# Patient Record
Sex: MALE | Race: OTHER | HISPANIC OR LATINO
[De-identification: names, ages, dates, MRNs, and addresses within clinical notes are randomized per-mention and may not be internally consistent; named-entity substitution may affect disease eponyms.]

---

## 2017-02-14 ENCOUNTER — RECORD ABSTRACTING (OUTPATIENT)
Age: 42
End: 2017-02-14

## 2017-02-14 DIAGNOSIS — I10 ESSENTIAL (PRIMARY) HYPERTENSION: ICD-10-CM

## 2017-02-14 DIAGNOSIS — E11.9 TYPE 2 DIABETES MELLITUS W/OUT COMPLICATIONS: ICD-10-CM

## 2017-02-14 DIAGNOSIS — I67.9 CEREBROVASCULAR DISEASE, UNSPECIFIED: ICD-10-CM

## 2017-02-14 DIAGNOSIS — E78.5 HYPERLIPIDEMIA, UNSPECIFIED: ICD-10-CM

## 2017-02-14 DIAGNOSIS — Z86.73 PERSONAL HISTORY OF TRANSIENT ISCHEMIC ATTACK (TIA), AND CEREBRAL INFARCTION W/OUT RESIDUAL DEFICITS: ICD-10-CM

## 2017-02-14 DIAGNOSIS — E66.9 OBESITY, UNSPECIFIED: ICD-10-CM

## 2017-02-15 ENCOUNTER — APPOINTMENT (OUTPATIENT)
Dept: CARDIOLOGY | Facility: CLINIC | Age: 42
End: 2017-02-15

## 2018-07-27 PROBLEM — Z86.73 HISTORY OF STROKE: Status: RESOLVED | Noted: 2017-02-14 | Resolved: 2018-07-27

## 2021-12-15 ENCOUNTER — EMERGENCY (EMERGENCY)
Facility: HOSPITAL | Age: 46
LOS: 0 days | Discharge: HOME | End: 2021-12-15
Attending: EMERGENCY MEDICINE | Admitting: EMERGENCY MEDICINE
Payer: MEDICAID

## 2021-12-15 VITALS
OXYGEN SATURATION: 97 % | TEMPERATURE: 98 F | DIASTOLIC BLOOD PRESSURE: 97 MMHG | SYSTOLIC BLOOD PRESSURE: 143 MMHG | HEART RATE: 73 BPM | RESPIRATION RATE: 18 BRPM | WEIGHT: 186.95 LBS

## 2021-12-15 DIAGNOSIS — Z95.0 PRESENCE OF CARDIAC PACEMAKER: ICD-10-CM

## 2021-12-15 DIAGNOSIS — E11.9 TYPE 2 DIABETES MELLITUS WITHOUT COMPLICATIONS: ICD-10-CM

## 2021-12-15 DIAGNOSIS — I48.91 UNSPECIFIED ATRIAL FIBRILLATION: ICD-10-CM

## 2021-12-15 DIAGNOSIS — I95.9 HYPOTENSION, UNSPECIFIED: ICD-10-CM

## 2021-12-15 DIAGNOSIS — I95.1 ORTHOSTATIC HYPOTENSION: ICD-10-CM

## 2021-12-15 PROCEDURE — 99284 EMERGENCY DEPT VISIT MOD MDM: CPT

## 2021-12-15 NOTE — ED ADULT NURSE NOTE - OBJECTIVE STATEMENT
Patient c/o sleeping late and not taking midodrine on time. Patient reports feeling better once taking his at home midodrine. Denies nausea, dizziness, vomiting, fever, chills, SOB, CP. Nos/s of distress noted.

## 2021-12-15 NOTE — ED PROVIDER NOTE - NSCAREINITIATED _GEN_ER
ASSISTING PRIMARY RN   PATIENT APPROVED FOR DISCHARGE PER ER PROVIDER. PATIENT GIVEN VERBAL AND WRITTEN DISCHARGE INSTRUCTIONS. GIVEN INSTRUCTIONS ON  FOLLOW UP WITH [place] UNDERSTANDING AND SIGNED DISCHARGE INSTRUCTIONS.      PATIENT AOX3 O
Chitra Philippe)

## 2021-12-15 NOTE — ED PROVIDER NOTE - PATIENT PORTAL LINK FT
You can access the FollowMyHealth Patient Portal offered by [facility] by registering at the following website: [email or web address]. By joining [employer]’s FollowMyHealth portal, you will also be able to view your health information using other applications (apps) compatible with our system.

## 2021-12-15 NOTE — ED PROVIDER NOTE - NSFOLLOWUPINSTRUCTIONS_ED_ALL_ED_FT
Orthostatic Hypotension      Blood pressure is a measurement of how strongly, or weakly, your blood is pressing against the walls of your arteries. Orthostatic hypotension is a sudden drop in blood pressure that happens when you quickly change positions, such as when you get up from sitting or lying down.    Arteries are blood vessels that carry blood from your heart throughout your body. When blood pressure is too low, you may not get enough blood to your brain or to the rest of your organs. This can cause weakness, light-headedness, rapid heartbeat, and fainting. This can last for just a few seconds or for up to a few minutes. Orthostatic hypotension is usually not a serious problem. However, if it happens frequently or gets worse, it may be a sign of something more serious.      What are the causes?  This condition may be caused by:  •Sudden changes in posture, such as standing up quickly after you have been sitting or lying down.      •Blood loss.      •Loss of body fluids (dehydration).      •Heart problems.      •Hormone (endocrine) problems.      •Pregnancy.      •Severe infection.      •Lack of certain nutrients.      •Severe allergic reactions (anaphylaxis).      •Certain medicines, such as blood pressure medicine or medicines that make the body lose excess fluids (diuretics). Sometimes, this condition can be caused by not taking medicine as directed, such as taking too much of a certain medicine.        What increases the risk?  The following factors may make you more likely to develop this condition:  •Age. Risk increases as you get older.      •Conditions that affect the heart or the central nervous system.      •Taking certain medicines, such as blood pressure medicine or diuretics.      •Being pregnant.        What are the signs or symptoms?  Symptoms of this condition may include:  •Weakness.      •Light-headedness.      •Dizziness.      •Blurred vision.      •Fatigue.      •Rapid heartbeat.      •Fainting, in severe cases.        How is this diagnosed?  This condition is diagnosed based on:  •Your medical history.      •Your symptoms.    •Your blood pressure measurement. Your health care provider will check your blood pressure when you are:  •Lying down.      •Sitting.      •Standing.        A blood pressure reading is recorded as two numbers, such as "120 over 80" (or 120/80). The first ("top") number is called the systolic pressure. It is a measure of the pressure in your arteries as your heart beats. The second ("bottom") number is called the diastolic pressure. It is a measure of the pressure in your arteries when your heart relaxes between beats. Blood pressure is measured in a unit called mm Hg. Healthy blood pressure for most adults is 120/80. If your blood pressure is below 90/60, you may be diagnosed with hypotension.  Other information or tests that may be used to diagnose orthostatic hypotension include:  •Your other vital signs, such as your heart rate and temperature.      •Blood tests.      •Tilt table test. For this test, you will be safely secured to a table that moves you from a lying position to an upright position. Your heart rhythm and blood pressure will be monitored during the test.        How is this treated?  This condition may be treated by:  •Changing your diet. This may involve eating more salt (sodium) or drinking more water.      •Taking medicines to raise your blood pressure.      •Changing the dosage of certain medicines you are taking that might be lowering your blood pressure.      •Wearing compression stockings. These stockings help to prevent blood clots and reduce swelling in your legs.    In some cases, you may need to go to the hospital for:  •Fluid replacement. This means you will receive fluids through an IV.      •Blood replacement. This means you will receive donated blood through an IV (transfusion).      •Treating an infection or heart problems, if this applies.      •Monitoring. You may need to be monitored while medicines that you are taking wear off.        Follow these instructions at home:      Eating and drinking      •Drink enough fluid to keep your urine pale yellow.    •Eat a healthy diet, and follow instructions from your health care provider about eating or drinking restrictions. A healthy diet includes:  •Fresh fruits and vegetables.      •Whole grains.      •Lean meats.      •Low-fat dairy products.        •Eat extra salt only as directed. Do not add extra salt to your diet unless your health care provider told you to do that.      •Eat frequent, small meals.      •Avoid standing up suddenly after eating.      Medicines   •Take over-the-counter and prescription medicines only as told by your health care provider.  •Follow instructions from your health care provider about changing the dosage of your current medicines, if this applies.      •Do not stop or adjust any of your medicines on your own.          General instructions      •Wear compression stockings as told by your health care provider.      •Get up slowly from lying down or sitting positions. This gives your blood pressure a chance to adjust.      •Avoid hot showers and excessive heat as directed by your health care provider.      •Return to your normal activities as told by your health care provider. Ask your health care provider what activities are safe for you.      • Do not use any products that contain nicotine or tobacco, such as cigarettes, e-cigarettes, and chewing tobacco. If you need help quitting, ask your health care provider.      •Keep all follow-up visits as told by your health care provider. This is important.        Contact a health care provider if you:    •Vomit.      •Have diarrhea.      •Have a fever for more than 2–3 days.      •Feel more thirsty than usual.      •Feel weak and tired.        Get help right away if you:    •Have chest pain.      •Have a fast or irregular heartbeat.      •Develop numbness in any part of your body.      •Cannot move your arms or your legs.      •Have trouble speaking.      •Become sweaty or feel light-headed.      •Faint.      •Feel short of breath.      •Have trouble staying awake.      •Feel confused.        Summary    •Orthostatic hypotension is a sudden drop in blood pressure that happens when you quickly change positions.      •Orthostatic hypotension is usually not a serious problem.      •It is diagnosed by having your blood pressure taken lying down, sitting, and then standing.      •It may be treated by changing your diet or adjusting your medicines.      This information is not intended to replace advice given to you by your health care provider. Make sure you discuss any questions you have with your health care provider.      Document Revised: 06/13/2019 Document Reviewed: 06/13/2019    [employer] Patient Education © 2021 [employer]

## 2021-12-15 NOTE — ED PROVIDER NOTE - OBJECTIVE STATEMENT
46 y.o. male with a PMH of DM, A.fib, PPM, and orthostatic hypotension presented to the ER c/o dizziness after getting out of bed too quickly.  Reports sleeping late and did not take his midodrine at the usual time.  Pt took his meds and is feeling better.  Denies chest pain, SOB, palpitations, nausea, vomiting, fever, chills.  No other complaints.  PCP in the [place], visiting with his girlfriend.

## 2021-12-15 NOTE — ED PROVIDER NOTE - EYES, MLM
Clear bilaterally, pupils equal, round and reactive to light. Maya Holley  (RN)  2017 02:38:19 Maya Holley  (RN)  2017 02:50:28

## 2021-12-15 NOTE — ED PROVIDER NOTE - NSFOLLOWUPCLINICS_GEN_ALL_ED_FT
[facility]  Medicine  [address], [place]   Phone: [phone]  Fax:   Follow Up Time: 1-3 Days

## 2022-08-03 NOTE — ED ADULT NURSE NOTE - HISTORY OF COVID-19 VACCINATION
----- Message from Jose Ontiveros PA-C sent at 8/3/2022 12:12 PM CDT -----  Please notify.  Follow CDC guidelines for quarantine.   No

## 2024-03-07 ENCOUNTER — OUTPATIENT (OUTPATIENT)
Dept: OUTPATIENT SERVICES | Facility: HOSPITAL | Age: 49
LOS: 1 days | End: 2024-03-07
Payer: MEDICAID

## 2024-03-07 DIAGNOSIS — F52.21 MALE ERECTILE DISORDER: ICD-10-CM

## 2024-03-07 DIAGNOSIS — Z00.8 ENCOUNTER FOR OTHER GENERAL EXAMINATION: ICD-10-CM

## 2024-03-07 PROCEDURE — 93980 PENILE VASCULAR STUDY: CPT | Mod: 26

## 2024-03-07 PROCEDURE — 93980 PENILE VASCULAR STUDY: CPT

## 2024-03-08 DIAGNOSIS — F52.21 MALE ERECTILE DISORDER: ICD-10-CM

## 2024-03-22 ENCOUNTER — APPOINTMENT (OUTPATIENT)
Dept: UROLOGY | Facility: CLINIC | Age: 49
End: 2024-03-22
Payer: MEDICAID

## 2024-03-22 VITALS
RESPIRATION RATE: 16 BRPM | HEIGHT: 75 IN | TEMPERATURE: 98.1 F | HEART RATE: 87 BPM | DIASTOLIC BLOOD PRESSURE: 83 MMHG | WEIGHT: 219 LBS | SYSTOLIC BLOOD PRESSURE: 116 MMHG | OXYGEN SATURATION: 98 % | BODY MASS INDEX: 27.23 KG/M2

## 2024-03-22 DIAGNOSIS — R56.9 UNSPECIFIED CONVULSIONS: ICD-10-CM

## 2024-03-22 DIAGNOSIS — Z78.9 OTHER SPECIFIED HEALTH STATUS: ICD-10-CM

## 2024-03-22 DIAGNOSIS — Z83.3 FAMILY HISTORY OF DIABETES MELLITUS: ICD-10-CM

## 2024-03-22 DIAGNOSIS — M79.2 NEURALGIA AND NEURITIS, UNSPECIFIED: ICD-10-CM

## 2024-03-22 DIAGNOSIS — Z82.49 FAMILY HISTORY OF ISCHEMIC HEART DISEASE AND OTHER DISEASES OF THE CIRCULATORY SYSTEM: ICD-10-CM

## 2024-03-22 PROCEDURE — G2211 COMPLEX E/M VISIT ADD ON: CPT | Mod: NC,1L

## 2024-03-22 PROCEDURE — 99214 OFFICE O/P EST MOD 30 MIN: CPT

## 2024-03-22 RX ORDER — APIXABAN 5 MG/1
5 TABLET, FILM COATED ORAL
Refills: 0 | Status: ACTIVE | COMMUNITY

## 2024-03-22 NOTE — HISTORY OF PRESENT ILLNESS
[FreeTextEntry1] : 48M w hx of uncontrolled type 2 diabetes, hypertension, A-fib status post pacemaker currently on Eliquis, CVA, MI, and below the knee amputation, presents for evaluation of erectile dysfunction and discussion regarding penile prosthesis.  He has a long history of erectile dysfunction, approximately 3 years, and has tried and failed sildenafil 100 mg and Cialis 20 mg.  He reports that he was scheduled to undergo penile prosthesis placement in the [place] in June 2023.  At that time, he developed a nonhealing right foot ulcer which eventually led to below-knee amputation.  He followed up with another urologist who performed a Doppler study, which demonstrated no response to Caverject injection, erectile dysfunction likely due to arterial insufficiency.  Presents today to discuss penile prosthesis.  Blood work from 3/7/2024: HbA1c 7.7 CBC within normal limits CMP demonstrates a glucose of 215 Creatinine 1.22 with estimated GFR 73

## 2024-03-22 NOTE — PHYSICAL EXAM
[General Appearance - Well Developed] : well developed [General Appearance - Well Nourished] : well nourished [Normal Appearance] : normal appearance [Well Groomed] : well groomed [Edema] : no peripheral edema [General Appearance - In No Acute Distress] : no acute distress [Respiration, Rhythm And Depth] : normal respiratory rhythm and effort [Exaggerated Use Of Accessory Muscles For Inspiration] : no accessory muscle use [Abdomen Soft] : soft [Abdomen Tenderness] : non-tender [Costovertebral Angle Tenderness] : no ~M costovertebral angle tenderness [Urinary Bladder Findings] : the bladder was normal on palpation [Penis Abnormality] : normal uncircumcised penis [Testes Tenderness] : no tenderness of the testes [Scrotum] : the scrotum was normal [No Focal Deficits] : no focal deficits [] : no rash [Oriented To Time, Place, And Person] : oriented to person, place, and time [Affect] : the affect was normal [Mood] : the mood was normal [Not Anxious] : not anxious [No Palpable Adenopathy] : no palpable adenopathy [FreeTextEntry1] : In wheelchair [de-identified] : Phimosis with signs of BXO [de-identified] : Right below-knee amputation, ambulating in wheelchair

## 2024-03-22 NOTE — PLAN

## 2024-03-22 NOTE — ASSESSMENT
[FreeTextEntry1] : 48M w hx of uncontrolled type 2 diabetes, hypertension, A-fib status post pacemaker currently on Eliquis, CVA, MI, and below the knee amputation, presents for evaluation of erectile dysfunction and discussion regarding penile prosthesis.    #Phimosis with signs of BXO -Lengthy discussion regarding his history of diabetes and limitations of placing a penile prosthesis with phimosis.  After lengthy discussion the electing for circumcision.  All aspects of circumcision reviewed in detail with regards preparation, outcomes, and adverse events.  They will undergo circumcision the first before proceeding to implant. -He will need both cardiac and medical clearance, especially to discontinue Eliquis prior and after surgery.  #ED likely vasculogenic -Can proceed with implant after circumcision. as pt has already tried and failed maximal oral therapy with PDE5i and ICI. Penile doppler also reviewed and showed no response c/w arterial insufficiency   Erectile dysfunction, its etiology, risk factors and natural history were discussed with the patient. Work-up and empiric management were discussed. From least to most invasive, treatments including behavioral changes (weight loss, exercise, improved sleep), oral PDE5i, vacuum erection device, intraurethral pellets, intracavernosal injections, and penile prosthetic implantation were described. Relevant individual risks and benefits disclosed. I explained that there are different causes for ED including psychogenic, vasculogenic, neurogenic and medication side-effect related causes. Oftentimes there are multiple causes.   The patient understands that the risks of PDE5is include facial redness, flushing, GERD, back pain, priapism, chest pain/MI, arrhythmia, dizziness, drop in BP, impaired vision and loss of hearing. He understands that the medication may take up to an hour to function and requires sexual stimulation. He was told not to take his medication within 4 hours of alpha blockers, or not at all if ever taking nitroglycerin. Both sildenafil and vardenafil, but not tadalafil, have some cross-reactivity with PDE6 and thus may produce visual side effects. He also was told to go to the ER immediately if he noticed any blindness or visual changes, or for any painful erection lasting > 4 hrs. He denies any history nitrate medication use for angina.

## 2024-04-01 ENCOUNTER — OUTPATIENT (OUTPATIENT)
Dept: OUTPATIENT SERVICES | Facility: HOSPITAL | Age: 49
LOS: 1 days | End: 2024-04-01
Payer: MEDICAID

## 2024-04-01 VITALS
HEART RATE: 84 BPM | SYSTOLIC BLOOD PRESSURE: 124 MMHG | TEMPERATURE: 98 F | WEIGHT: 220.02 LBS | RESPIRATION RATE: 16 BRPM | DIASTOLIC BLOOD PRESSURE: 81 MMHG | OXYGEN SATURATION: 99 % | HEIGHT: 75 IN

## 2024-04-01 DIAGNOSIS — Z95.0 PRESENCE OF CARDIAC PACEMAKER: Chronic | ICD-10-CM

## 2024-04-01 DIAGNOSIS — Z89.511 ACQUIRED ABSENCE OF RIGHT LEG BELOW KNEE: Chronic | ICD-10-CM

## 2024-04-01 DIAGNOSIS — Z95.5 PRESENCE OF CORONARY ANGIOPLASTY IMPLANT AND GRAFT: Chronic | ICD-10-CM

## 2024-04-01 DIAGNOSIS — N47.1 PHIMOSIS: ICD-10-CM

## 2024-04-01 DIAGNOSIS — Z01.818 ENCOUNTER FOR OTHER PREPROCEDURAL EXAMINATION: ICD-10-CM

## 2024-04-01 LAB
A1C WITH ESTIMATED AVERAGE GLUCOSE RESULT: 7.6 % — HIGH (ref 4–5.6)
ALBUMIN SERPL ELPH-MCNC: 4.1 G/DL — SIGNIFICANT CHANGE UP (ref 3.5–5.2)
ALP SERPL-CCNC: 95 U/L — SIGNIFICANT CHANGE UP (ref 30–115)
ALT FLD-CCNC: 12 U/L — SIGNIFICANT CHANGE UP (ref 0–41)
ANION GAP SERPL CALC-SCNC: 13 MMOL/L — SIGNIFICANT CHANGE UP (ref 7–14)
APPEARANCE UR: CLEAR — SIGNIFICANT CHANGE UP
APTT BLD: 33.2 SEC — SIGNIFICANT CHANGE UP (ref 27–39.2)
AST SERPL-CCNC: 18 U/L — SIGNIFICANT CHANGE UP (ref 0–41)
BASOPHILS # BLD AUTO: 0.06 K/UL — SIGNIFICANT CHANGE UP (ref 0–0.2)
BASOPHILS NFR BLD AUTO: 0.9 % — SIGNIFICANT CHANGE UP (ref 0–1)
BILIRUB SERPL-MCNC: 0.7 MG/DL — SIGNIFICANT CHANGE UP (ref 0.2–1.2)
BILIRUB UR-MCNC: NEGATIVE — SIGNIFICANT CHANGE UP
BUN SERPL-MCNC: 27 MG/DL — HIGH (ref 10–20)
CALCIUM SERPL-MCNC: 8.9 MG/DL — SIGNIFICANT CHANGE UP (ref 8.4–10.5)
CHLORIDE SERPL-SCNC: 102 MMOL/L — SIGNIFICANT CHANGE UP (ref 98–110)
CO2 SERPL-SCNC: 23 MMOL/L — SIGNIFICANT CHANGE UP (ref 17–32)
COLOR SPEC: YELLOW — SIGNIFICANT CHANGE UP
CREAT SERPL-MCNC: 1.2 MG/DL — SIGNIFICANT CHANGE UP (ref 0.7–1.5)
DIFF PNL FLD: ABNORMAL
EGFR: 74 ML/MIN/1.73M2 — SIGNIFICANT CHANGE UP
EOSINOPHIL # BLD AUTO: 0.17 K/UL — SIGNIFICANT CHANGE UP (ref 0–0.7)
EOSINOPHIL NFR BLD AUTO: 2.5 % — SIGNIFICANT CHANGE UP (ref 0–8)
ESTIMATED AVERAGE GLUCOSE: 171 MG/DL — HIGH (ref 68–114)
GLUCOSE SERPL-MCNC: 197 MG/DL — HIGH (ref 70–99)
GLUCOSE UR QL: >=1000 MG/DL
HCT VFR BLD CALC: 45.8 % — SIGNIFICANT CHANGE UP (ref 42–52)
HGB BLD-MCNC: 15.2 G/DL — SIGNIFICANT CHANGE UP (ref 14–18)
IMM GRANULOCYTES NFR BLD AUTO: 0.3 % — SIGNIFICANT CHANGE UP (ref 0.1–0.3)
INR BLD: 1.18 RATIO — SIGNIFICANT CHANGE UP (ref 0.65–1.3)
KETONES UR-MCNC: NEGATIVE MG/DL — SIGNIFICANT CHANGE UP
LEUKOCYTE ESTERASE UR-ACNC: NEGATIVE — SIGNIFICANT CHANGE UP
LYMPHOCYTES # BLD AUTO: 2.54 K/UL — SIGNIFICANT CHANGE UP (ref 1.2–3.4)
LYMPHOCYTES # BLD AUTO: 37.5 % — SIGNIFICANT CHANGE UP (ref 20.5–51.1)
MCHC RBC-ENTMCNC: 27.4 PG — SIGNIFICANT CHANGE UP (ref 27–31)
MCHC RBC-ENTMCNC: 33.2 G/DL — SIGNIFICANT CHANGE UP (ref 32–37)
MCV RBC AUTO: 82.7 FL — SIGNIFICANT CHANGE UP (ref 80–94)
MONOCYTES # BLD AUTO: 0.62 K/UL — HIGH (ref 0.1–0.6)
MONOCYTES NFR BLD AUTO: 9.1 % — SIGNIFICANT CHANGE UP (ref 1.7–9.3)
NEUTROPHILS # BLD AUTO: 3.37 K/UL — SIGNIFICANT CHANGE UP (ref 1.4–6.5)
NEUTROPHILS NFR BLD AUTO: 49.7 % — SIGNIFICANT CHANGE UP (ref 42.2–75.2)
NITRITE UR-MCNC: NEGATIVE — SIGNIFICANT CHANGE UP
NRBC # BLD: 0 /100 WBCS — SIGNIFICANT CHANGE UP (ref 0–0)
PH UR: 5.5 — SIGNIFICANT CHANGE UP (ref 5–8)
PLATELET # BLD AUTO: 161 K/UL — SIGNIFICANT CHANGE UP (ref 130–400)
PMV BLD: 11 FL — HIGH (ref 7.4–10.4)
POTASSIUM SERPL-MCNC: 4.4 MMOL/L — SIGNIFICANT CHANGE UP (ref 3.5–5)
POTASSIUM SERPL-SCNC: 4.4 MMOL/L — SIGNIFICANT CHANGE UP (ref 3.5–5)
PROT SERPL-MCNC: 7.1 G/DL — SIGNIFICANT CHANGE UP (ref 6–8)
PROT UR-MCNC: SIGNIFICANT CHANGE UP MG/DL
PROTHROM AB SERPL-ACNC: 13.5 SEC — HIGH (ref 9.95–12.87)
RBC # BLD: 5.54 M/UL — SIGNIFICANT CHANGE UP (ref 4.7–6.1)
RBC # FLD: 13.2 % — SIGNIFICANT CHANGE UP (ref 11.5–14.5)
SODIUM SERPL-SCNC: 138 MMOL/L — SIGNIFICANT CHANGE UP (ref 135–146)
SP GR SPEC: 1.03 — SIGNIFICANT CHANGE UP (ref 1–1.03)
T4 AB SER-ACNC: 7.5 UG/DL — SIGNIFICANT CHANGE UP (ref 4.6–12)
TSH SERPL-MCNC: 5.76 UIU/ML — HIGH (ref 0.27–4.2)
UROBILINOGEN FLD QL: 0.2 MG/DL — SIGNIFICANT CHANGE UP (ref 0.2–1)
WBC # BLD: 6.78 K/UL — SIGNIFICANT CHANGE UP (ref 4.8–10.8)
WBC # FLD AUTO: 6.78 K/UL — SIGNIFICANT CHANGE UP (ref 4.8–10.8)

## 2024-04-01 PROCEDURE — 85730 THROMBOPLASTIN TIME PARTIAL: CPT

## 2024-04-01 PROCEDURE — 85610 PROTHROMBIN TIME: CPT

## 2024-04-01 PROCEDURE — 83036 HEMOGLOBIN GLYCOSYLATED A1C: CPT

## 2024-04-01 PROCEDURE — 84436 ASSAY OF TOTAL THYROXINE: CPT

## 2024-04-01 PROCEDURE — 99214 OFFICE O/P EST MOD 30 MIN: CPT | Mod: 25

## 2024-04-01 PROCEDURE — 36415 COLL VENOUS BLD VENIPUNCTURE: CPT

## 2024-04-01 PROCEDURE — 85025 COMPLETE CBC W/AUTO DIFF WBC: CPT

## 2024-04-01 PROCEDURE — 93010 ELECTROCARDIOGRAM REPORT: CPT

## 2024-04-01 PROCEDURE — 81001 URINALYSIS AUTO W/SCOPE: CPT

## 2024-04-01 PROCEDURE — 80053 COMPREHEN METABOLIC PANEL: CPT

## 2024-04-01 PROCEDURE — 93005 ELECTROCARDIOGRAM TRACING: CPT

## 2024-04-01 PROCEDURE — 84443 ASSAY THYROID STIM HORMONE: CPT

## 2024-04-01 PROCEDURE — 87086 URINE CULTURE/COLONY COUNT: CPT

## 2024-04-01 RX ORDER — INSULIN GLARGINE 100 [IU]/ML
32 INJECTION, SOLUTION SUBCUTANEOUS
Refills: 0 | DISCHARGE

## 2024-04-01 NOTE — H&P PST ADULT - NSICDXPASTSURGICALHX_GEN_ALL_CORE_FT
PAST SURGICAL HISTORY:  Pacemaker     S/P BKA (below knee amputation), right     S/P coronary artery stent placement

## 2024-04-01 NOTE — H&P PST ADULT - REASON FOR ADMISSION
Case Type: OP  Suite: [facility]  Proceduralist: Renny London  Confirmed Surgery Date Time: 04- -  PAST Date Time: 04- - 6:30  Procedure: CIRCUMCISION  Laterality: N/A  Length of Procedure: 30 Minutes  Anesthesia Type: General

## 2024-04-01 NOTE — H&P PST ADULT - NSANTHOSAYNRD_GEN_A_CORE
No. MATHEW screening performed.  STOP BANG Legend: 0-2 = LOW Risk; 3-4 = INTERMEDIATE Risk; 5-8 = HIGH Risk

## 2024-04-01 NOTE — H&P PST ADULT - PATIENT'S GENDER IDENTITY
Nursing Note by Chica Ventura NCMA at 05/05/18 08:12 AM     Author:  Chica Ventura NCMA Service:  (none) Author Type:  Certified Medical Assistant     Filed:  05/05/18 08:12 AM Encounter Date:  5/5/2018 Status:  Signed     :  Chica Ventura NCMA (Certified Medical Assistant)            If provider orders tests at today's visit, patient would like to be contacted via  ([place] or by telephone).  If to contact patient by phone, patient's preferred phone # is   [phone] (cell)   and it is[PB1.1T] ok[PB1.1M] to leave message on voice mail or with family member.  If medications are ordered at today's visit, the pharmacy name/location patient would like them to be sent to is [facility] [address]  .[PB1.1T]         Revision History        User Key Date/Time User Provider Type Action    > PB1.1 05/05/18 08:12 AM Chica Ventura NCMA Certified Medical Assistant Sign    M - Manual, T - Template            
Male

## 2024-04-01 NOTE — H&P PST ADULT - HISTORY OF PRESENT ILLNESS
Patient is a 49 yo male wheeled to [facility] by his wife for the preparations of Circumcision due to excessive foreskin. Patient is also planned to have penile prosthesis after circumcision.  Patient denies any cp, sob, palpitations, fever, cough, URI, abdominal pains, N/V, UTI, Rashes or open wounds.  As per patient exercise tolerance of 1-2 fos walks with out sob  Patient denies any s/s covid 19 and reports no contact with known positive people. Patient instructed to continue to self monitor and report any concerns to MD. Pt will continue to practice self isolation and  exposure control measures pre op   Anesthesia Alert  NO--Difficult Airway  NO--History of neck surgery or radiation  NO--Limited ROM of neck  NO--History of Malignant hyperthermia  NO--Personal or family history of Pseudocholinesterase deficiency  NO--Prior Anesthesia Complication  NO--Latex Allergy  NO--Loose teeth  NO--History of Rheumatoid Arthritis  NO--MATHEW  YES--Risk of bleeding due to Eliquis   Pt instructed to stop vitamins/supplements/herbal medications for one week prior to surgery  As per patient this is the complete medical, surgical history and medications.  Duke Activity Status Index (DASI) from [facility]  on 4/1/2024  ** All calculations should be rechecked by clinician prior to use **    RESULT SUMMARY:  9.95 points  The higher the score (maximum 58.2), the higher the functional status.  3.97 METs  INPUTS:  Take care of self —> 2.75 = Yes  Walk indoors —> 1.75 = Yes  Walk 1&ndash;2 blocks on level ground —> 2.75 = Yes  Climb a flight of stairs or walk up a hill —> 0 = No  Run a short distance —> 0 = No  Do light work around the house —> 2.7 = Yes  Do moderate work around the house —> 0 = No  Do heavy work around the house —> 0 = No  Do yardwork —> 0 = No  Have sexual relations —> 0 = No  Participate in moderate recreational activities —> 0 = No  Participate in strenuous sports —> 0 = No  Revised Cardiac Risk Index for Pre-Operative Risk from [facility]  on 4/1/2024  ** All calculations should be rechecked by clinician prior to use **    RESULT SUMMARY:  3 points  Class IV Risk  15.0 %  30-day risk of death, MI, or cardiac arrest  From [place] 2017. These numbers are higher than those from the original study (Jerel 1999). See Evidence for details.  INPUTS:  Elevated-risk surgery —> 1 = Yes  History of ischemic heart disease —> 1 = Yes  History of congestive heart failure —> 0 = No  History of cerebrovascular disease —> 1 = Yes  Pre-operative treatment with insulin —> 0 = No  Pre-operative creatinine >2 mg/dL / 176.8 µmol/L —> 0 = No

## 2024-04-01 NOTE — H&P PST ADULT - ATTENDING COMMENTS
Plan for circumcision. Risks, benefits and alternatives discussed with patient.    Pt understands there is a risk of bleeding which can lead to a hematoma and need for another operation. Risk of infection and/or dehiscence of incision. This may require antibiotics, wound debridement, and dressing changes which can take several weeks to months to heal. Anesthesia risks also reviewed.  He understands that he can not have sex for at least 4-6 weeks, no showering for first two days, and the he must apply bacitracin over his incision for the next two weeks twice a day.     Pt provided verbal and written consent.

## 2024-04-01 NOTE — H&P PST ADULT - NSICDXFAMILYHX_GEN_ALL_CORE_FT
FAMILY HISTORY:  Father  Still living? No  Family history of diabetes mellitus (DM), Age at diagnosis: Age Unknown  FH: CAD (coronary artery disease), Age at diagnosis: Age Unknown

## 2024-04-01 NOTE — H&P PST ADULT - NSICDXPASTMEDICALHX_GEN_ALL_CORE_FT
PAST MEDICAL HISTORY:  Afib     CHF (congestive heart failure)     Chronic kidney disease (CKD)     CVA (cerebral vascular accident)     DM (diabetes mellitus)     Erectile disorder     GERD (gastroesophageal reflux disease)     H/O hyperthyroidism     History of myocardial ischemia     HTN (hypertension)     Seizures

## 2024-04-02 DIAGNOSIS — Z01.818 ENCOUNTER FOR OTHER PREPROCEDURAL EXAMINATION: ICD-10-CM

## 2024-04-02 DIAGNOSIS — N47.1 PHIMOSIS: ICD-10-CM

## 2024-04-02 LAB
CULTURE RESULTS: SIGNIFICANT CHANGE UP
SPECIMEN SOURCE: SIGNIFICANT CHANGE UP

## 2024-04-09 ENCOUNTER — RESULT REVIEW (OUTPATIENT)
Age: 49
End: 2024-04-09

## 2024-04-09 ENCOUNTER — TRANSCRIPTION ENCOUNTER (OUTPATIENT)
Age: 49
End: 2024-04-09

## 2024-04-09 ENCOUNTER — OUTPATIENT (OUTPATIENT)
Dept: OUTPATIENT SERVICES | Facility: HOSPITAL | Age: 49
LOS: 1 days | Discharge: HOME | End: 2024-04-09
Payer: MEDICAID

## 2024-04-09 ENCOUNTER — APPOINTMENT (OUTPATIENT)
Dept: UROLOGY | Facility: HOSPITAL | Age: 49
End: 2024-04-09

## 2024-04-09 VITALS
WEIGHT: 220.02 LBS | SYSTOLIC BLOOD PRESSURE: 157 MMHG | HEIGHT: 75 IN | HEART RATE: 79 BPM | DIASTOLIC BLOOD PRESSURE: 90 MMHG | OXYGEN SATURATION: 99 % | RESPIRATION RATE: 17 BRPM | TEMPERATURE: 96 F

## 2024-04-09 VITALS — HEART RATE: 78 BPM | DIASTOLIC BLOOD PRESSURE: 89 MMHG | SYSTOLIC BLOOD PRESSURE: 147 MMHG | RESPIRATION RATE: 16 BRPM

## 2024-04-09 DIAGNOSIS — Z89.511 ACQUIRED ABSENCE OF RIGHT LEG BELOW KNEE: Chronic | ICD-10-CM

## 2024-04-09 DIAGNOSIS — N47.1 PHIMOSIS: ICD-10-CM

## 2024-04-09 DIAGNOSIS — Z95.5 PRESENCE OF CORONARY ANGIOPLASTY IMPLANT AND GRAFT: Chronic | ICD-10-CM

## 2024-04-09 DIAGNOSIS — Z95.0 PRESENCE OF CARDIAC PACEMAKER: Chronic | ICD-10-CM

## 2024-04-09 PROCEDURE — 82962 GLUCOSE BLOOD TEST: CPT

## 2024-04-09 PROCEDURE — 88304 TISSUE EXAM BY PATHOLOGIST: CPT | Mod: 26

## 2024-04-09 PROCEDURE — 54161 CIRCUM 28 DAYS OR OLDER: CPT

## 2024-04-09 PROCEDURE — 88304 TISSUE EXAM BY PATHOLOGIST: CPT

## 2024-04-09 RX ORDER — ACETAMINOPHEN 500 MG
1000 TABLET ORAL ONCE
Refills: 0 | Status: DISCONTINUED | OUTPATIENT
Start: 2024-04-09 | End: 2024-04-09

## 2024-04-09 RX ORDER — LEVOTHYROXINE SODIUM 125 MCG
1 TABLET ORAL
Refills: 0 | DISCHARGE

## 2024-04-09 RX ORDER — ACETAMINOPHEN 500 MG/1
500 TABLET, COATED ORAL
Qty: 42 | Refills: 0 | Status: ACTIVE | COMMUNITY
Start: 2024-04-09 | End: 1900-01-01

## 2024-04-09 RX ORDER — CEPHALEXIN 500 MG/1
500 CAPSULE ORAL
Qty: 10 | Refills: 0 | Status: ACTIVE | COMMUNITY
Start: 2024-04-09 | End: 1900-01-01

## 2024-04-09 RX ORDER — ONDANSETRON 8 MG/1
4 TABLET, FILM COATED ORAL ONCE
Refills: 0 | Status: DISCONTINUED | OUTPATIENT
Start: 2024-04-09 | End: 2024-04-09

## 2024-04-09 RX ORDER — SODIUM CHLORIDE 9 MG/ML
1000 INJECTION, SOLUTION INTRAVENOUS
Refills: 0 | Status: DISCONTINUED | OUTPATIENT
Start: 2024-04-09 | End: 2024-04-09

## 2024-04-09 RX ADMIN — SODIUM CHLORIDE 100 MILLILITER(S): 9 INJECTION, SOLUTION INTRAVENOUS at 10:01

## 2024-04-09 NOTE — ASU DISCHARGE PLAN (ADULT/PEDIATRIC) - ASU DC SPECIAL INSTRUCTIONSFT
Continue ALL previously prescribed home meds including meds for Diabetes.    Take Tylenol for pain  Take Keflex antibiotic for 5 days to prevent infection given your high risk.  Follow up in 1 week for post op check, my  will call to schedule the appointment.    Remove dressing after two days, then you may shower.   No sex/masturbation for 6 weeks.

## 2024-04-09 NOTE — ASU DISCHARGE PLAN (ADULT/PEDIATRIC) - NS MD DC FALL RISK RISK
For information on Fall & Injury Prevention, visit: [email or web address] OR  [email or web address] OR  https://www.cdc.gov/steadi/patient.html

## 2024-04-09 NOTE — PRE-ANESTHESIA EVALUATION ADULT - NSANTHAGERD_ENT_A_CORE
Body Location Override (Optional): Right Superior Medial Upper Back Detail Level: Detailed Add 11891 Cpt? (Important Note: In 2017 The Use Of 25046 Is Being Tracked By Cms To Determine Future Global Period Reimbursement For Global Periods): yes Wound Evaluated By (Optional): Valeri Cockayne DO Wound Diameter In Cm(Optional): 0 Wound Crusting?: clean Sutures?: intact Wound Edema?: mild Wound Color?: pink Patient To Follow-Up With?: their primary dermatologist No

## 2024-04-09 NOTE — BRIEF OPERATIVE NOTE - NSICDXBRIEFPOSTOP_GEN_ALL_CORE_FT
POST-OP DIAGNOSIS:  Phimosis 09-Apr-2024 09:58:27  Renny London  BXO (balanitis xerotica obliterans) 09-Apr-2024 09:58:33  Renny London

## 2024-04-09 NOTE — BRIEF OPERATIVE NOTE - OPERATION/FINDINGS
Foreskin difficult to retract, excess smega removed and pt reprepped x2  Phimotic foreskin excised  Foreskin sent as specimen to pathology  4-chromic in interrupted fashion   xerform and trace dressing used as compressive dressing, pt to keep on for 2 days  Ppx antibiotics w Keflex

## 2024-04-09 NOTE — BRIEF OPERATIVE NOTE - NSICDXBRIEFPREOP_GEN_ALL_CORE_FT
PRE-OP DIAGNOSIS:  Phimosis 09-Apr-2024 09:58:13  Renny London  BXO (balanitis xerotica obliterans) 09-Apr-2024 09:58:19  Renny London

## 2024-04-09 NOTE — PRE-ANESTHESIA EVALUATION ADULT - NSANTHTIREDRD_ENT_A_CORE
[FreeTextEntry1] : \par Examination reveals a well built, well nourished individual, who presents to the office walking independently. Patient is afebrile today and is in NAD. Patient is well oriented to time, place, and person with appropriate mood and affect. Patient is able to get off and on the exam table without any problems.Gross cutaneous exam is normal. there is no significant lymphadenopathy or ligament laxity. \par \par Exam of spine: \par Flank asymmetry with deeper right flank crease \par With forward bend, mild left sided thoracic prominence \par ATR of 2-4 \par No edema, erythema, or ecchymosis noted\par 5/5 strength\par Full ROM of b/l upper and lower extremities\par No pressure sores or abrasions noted\par NV intact\par Brace appears to be fitting well\par \par Patient has Significant flat feet with standing. The arches collapse and the heels tip into valgus. The arches reform when sitting and on toe dorsiflexion. Subtalar motion is stiff There is mild heel cord tightness. The dorsiflexion is possible to 10 degrees with knee in extension and plantar flexion to 15 degrees. Knee range of motion is from 0-130 degrees of flexion on both sides.\par  \par Neurological examination reveals a grade 5/5 muscle power, sensation intact to crude touch and pinprick. Deep tendon reflexes are 1+ with ankle jerk and the knee jerk. The plantars are bilaterally down-going. There is no hairy patch, lipoma, sinus in the back. There is no pes cavus, asymmetry of the calves, significant leg length discrepancy, or significant cafe-au-lait spots. \par \par Evaluation of the left middle finger\par Splint removed for examination\par Skin is intact and there is no breakdown or abrasion\par he has extensor lag\par No angular or rotational deformity \par +mild ttp over the distal phalanx\par Neurovascularly intact. Nailbed and nail is intact.\par  \par  No

## 2024-04-15 DIAGNOSIS — N48.0 LEUKOPLAKIA OF PENIS: ICD-10-CM

## 2024-04-15 DIAGNOSIS — K21.9 GASTRO-ESOPHAGEAL REFLUX DISEASE WITHOUT ESOPHAGITIS: ICD-10-CM

## 2024-04-15 DIAGNOSIS — N18.9 CHRONIC KIDNEY DISEASE, UNSPECIFIED: ICD-10-CM

## 2024-04-15 DIAGNOSIS — Z79.84 LONG TERM (CURRENT) USE OF ORAL HYPOGLYCEMIC DRUGS: ICD-10-CM

## 2024-04-15 DIAGNOSIS — N47.1 PHIMOSIS: ICD-10-CM

## 2024-04-15 DIAGNOSIS — Z79.01 LONG TERM (CURRENT) USE OF ANTICOAGULANTS: ICD-10-CM

## 2024-04-15 DIAGNOSIS — Z89.511 ACQUIRED ABSENCE OF RIGHT LEG BELOW KNEE: ICD-10-CM

## 2024-04-15 DIAGNOSIS — Z79.4 LONG TERM (CURRENT) USE OF INSULIN: ICD-10-CM

## 2024-04-15 DIAGNOSIS — E11.22 TYPE 2 DIABETES MELLITUS WITH DIABETIC CHRONIC KIDNEY DISEASE: ICD-10-CM

## 2024-04-15 DIAGNOSIS — I48.91 UNSPECIFIED ATRIAL FIBRILLATION: ICD-10-CM

## 2024-04-15 DIAGNOSIS — I50.9 HEART FAILURE, UNSPECIFIED: ICD-10-CM

## 2024-04-15 DIAGNOSIS — R56.9 UNSPECIFIED CONVULSIONS: ICD-10-CM

## 2024-04-15 DIAGNOSIS — Z95.0 PRESENCE OF CARDIAC PACEMAKER: ICD-10-CM

## 2024-04-15 DIAGNOSIS — Z88.5 ALLERGY STATUS TO NARCOTIC AGENT: ICD-10-CM

## 2024-04-15 DIAGNOSIS — Z86.73 PERSONAL HISTORY OF TRANSIENT ISCHEMIC ATTACK (TIA), AND CEREBRAL INFARCTION WITHOUT RESIDUAL DEFICITS: ICD-10-CM

## 2024-04-15 DIAGNOSIS — I13.0 HYPERTENSIVE HEART AND CHRONIC KIDNEY DISEASE WITH HEART FAILURE AND STAGE 1 THROUGH STAGE 4 CHRONIC KIDNEY DISEASE, OR UNSPECIFIED CHRONIC KIDNEY DISEASE: ICD-10-CM

## 2024-04-15 DIAGNOSIS — Z95.5 PRESENCE OF CORONARY ANGIOPLASTY IMPLANT AND GRAFT: ICD-10-CM

## 2024-04-15 PROBLEM — I10 ESSENTIAL (PRIMARY) HYPERTENSION: Chronic | Status: ACTIVE | Noted: 2024-04-01

## 2024-04-15 PROBLEM — E11.9 TYPE 2 DIABETES MELLITUS WITHOUT COMPLICATIONS: Chronic | Status: ACTIVE | Noted: 2024-04-01

## 2024-04-15 PROBLEM — Z86.39 PERSONAL HISTORY OF OTHER ENDOCRINE, NUTRITIONAL AND METABOLIC DISEASE: Chronic | Status: ACTIVE | Noted: 2024-04-01

## 2024-04-15 PROBLEM — N52.9 MALE ERECTILE DYSFUNCTION, UNSPECIFIED: Chronic | Status: ACTIVE | Noted: 2024-04-01

## 2024-04-15 PROBLEM — Z86.79 PERSONAL HISTORY OF OTHER DISEASES OF THE CIRCULATORY SYSTEM: Chronic | Status: ACTIVE | Noted: 2024-04-01

## 2024-04-15 PROBLEM — I63.9 CEREBRAL INFARCTION, UNSPECIFIED: Chronic | Status: ACTIVE | Noted: 2024-04-01

## 2024-04-19 ENCOUNTER — APPOINTMENT (OUTPATIENT)
Dept: UROLOGY | Facility: CLINIC | Age: 49
End: 2024-04-19
Payer: MEDICAID

## 2024-04-19 VITALS
BODY MASS INDEX: 27.35 KG/M2 | SYSTOLIC BLOOD PRESSURE: 121 MMHG | HEIGHT: 75 IN | OXYGEN SATURATION: 98 % | DIASTOLIC BLOOD PRESSURE: 85 MMHG | WEIGHT: 220 LBS | HEART RATE: 91 BPM

## 2024-04-19 DIAGNOSIS — N48.0 LEUKOPLAKIA OF PENIS: ICD-10-CM

## 2024-04-19 DIAGNOSIS — N47.1 PHIMOSIS: ICD-10-CM

## 2024-04-19 PROCEDURE — G2211 COMPLEX E/M VISIT ADD ON: CPT | Mod: NC,1L

## 2024-04-19 PROCEDURE — 99214 OFFICE O/P EST MOD 30 MIN: CPT | Mod: 24

## 2024-05-10 ENCOUNTER — APPOINTMENT (OUTPATIENT)
Dept: UROLOGY | Facility: CLINIC | Age: 49
End: 2024-05-10
Payer: MEDICAID

## 2024-05-10 VITALS
WEIGHT: 220 LBS | TEMPERATURE: 98.1 F | SYSTOLIC BLOOD PRESSURE: 150 MMHG | RESPIRATION RATE: 16 BRPM | HEIGHT: 75 IN | BODY MASS INDEX: 27.35 KG/M2 | OXYGEN SATURATION: 98 % | DIASTOLIC BLOOD PRESSURE: 98 MMHG | HEART RATE: 89 BPM

## 2024-05-10 DIAGNOSIS — N52.01 ERECTILE DYSFUNCTION DUE TO ARTERIAL INSUFFICIENCY: ICD-10-CM

## 2024-05-10 DIAGNOSIS — Z98.890 OTHER SPECIFIED POSTPROCEDURAL STATES: ICD-10-CM

## 2024-05-10 PROCEDURE — 99213 OFFICE O/P EST LOW 20 MIN: CPT

## 2024-05-10 PROCEDURE — G2211 COMPLEX E/M VISIT ADD ON: CPT | Mod: NC,1L

## 2024-05-10 NOTE — ASSESSMENT
[FreeTextEntry1] : #Phimosis with signs of BXO - s/p circumcision on 4/9/24. Healing well without any pain. All stitches have fallen office. Wound is intact without any evidence of infection. There is a small ~2mm superficial separation at incision 10 o clock position. No drainage or evidence of infection. Pt instructed to apply bacitracin. This should heal in 1-2 weeks.  - Pt is Tentatively scheduled for his Penile Prosthesis on 5/28/24 - Will check in with patient one week prior to confirm he has made a complete recovery - Pt still requires medical and cardiac clearance  All questions and concerns addressed.

## 2024-05-10 NOTE — HISTORY OF PRESENT ILLNESS
[FreeTextEntry1] : 48M w hx of uncontrolled type 2 diabetes, hypertension, A-fib status post pacemaker currently on Eliquis, CVA, MI, and below the knee amputation, presents for evaluation of erectile dysfunction and discussion regarding penile prosthesis. He has a long history of erectile dysfunction, approximately 3 years, and has tried and failed sildenafil 100 mg and Cialis 20 mg. He reports that he was scheduled to undergo penile prosthesis placement in the [place] in June 2023. At that time, he developed a nonhealing right foot ulcer which eventually led to below-knee amputation. He followed up with another urologist who performed a Doppler study, which demonstrated no response to Caverject injection, erectile dysfunction likely due to arterial insufficiency. Presents today to discuss penile prosthesis.  Blood work from 3/7/2024: HbA1c 7.7 CBC within normal limits CMP demonstrates a glucose of 215 Creatinine 1.22 with estimated GFR 73  Office visit 4/19/24 Pt presents for a post op visit. He is s/p circumcision on 4/9/24. Reports that his pain has been well controlled. No fevers or chills at home. Took all of his appropriate medications and has been applying bacitracin to his incision daily. On exam, his incision is intact with sutures in place. No evidence of infection ie warmth, erythema, fluctuance or purulent drainage. He is still interested in having a penile prosthesis placed we reviewed all the risk benefits and alternatives to the procedure and patient provided verbal consent.  Office 5/10/24 Pt presents for wound check now that he is 4 weeks post op. He has made a near 100% recovery. Reports having no penile pain. On exam there is no swelling, inflammation or drainage. Sutures have all fallen off. There is a small superficial 2mm skin defect at the ~10 o'clock position.

## 2024-05-15 ENCOUNTER — OUTPATIENT (OUTPATIENT)
Dept: OUTPATIENT SERVICES | Facility: HOSPITAL | Age: 49
LOS: 1 days | End: 2024-05-15
Payer: MEDICAID

## 2024-05-15 VITALS
HEIGHT: 75 IN | TEMPERATURE: 98 F | RESPIRATION RATE: 16 BRPM | WEIGHT: 220.02 LBS | HEART RATE: 94 BPM | SYSTOLIC BLOOD PRESSURE: 113 MMHG | DIASTOLIC BLOOD PRESSURE: 80 MMHG | OXYGEN SATURATION: 99 %

## 2024-05-15 DIAGNOSIS — Z95.0 PRESENCE OF CARDIAC PACEMAKER: Chronic | ICD-10-CM

## 2024-05-15 DIAGNOSIS — Z01.818 ENCOUNTER FOR OTHER PREPROCEDURAL EXAMINATION: ICD-10-CM

## 2024-05-15 DIAGNOSIS — Z89.511 ACQUIRED ABSENCE OF RIGHT LEG BELOW KNEE: Chronic | ICD-10-CM

## 2024-05-15 DIAGNOSIS — N52.01 ERECTILE DYSFUNCTION DUE TO ARTERIAL INSUFFICIENCY: ICD-10-CM

## 2024-05-15 DIAGNOSIS — Z95.5 PRESENCE OF CORONARY ANGIOPLASTY IMPLANT AND GRAFT: Chronic | ICD-10-CM

## 2024-05-15 LAB
A1C WITH ESTIMATED AVERAGE GLUCOSE RESULT: 7.1 % — HIGH (ref 4–5.6)
ALBUMIN SERPL ELPH-MCNC: 4.1 G/DL — SIGNIFICANT CHANGE UP (ref 3.5–5.2)
ALP SERPL-CCNC: 110 U/L — SIGNIFICANT CHANGE UP (ref 30–115)
ALT FLD-CCNC: 9 U/L — SIGNIFICANT CHANGE UP (ref 0–41)
ANION GAP SERPL CALC-SCNC: 17 MMOL/L — HIGH (ref 7–14)
APPEARANCE UR: CLEAR — SIGNIFICANT CHANGE UP
APTT BLD: 31.8 SEC — SIGNIFICANT CHANGE UP (ref 27–39.2)
AST SERPL-CCNC: 20 U/L — SIGNIFICANT CHANGE UP (ref 0–41)
BASOPHILS # BLD AUTO: 0.06 K/UL — SIGNIFICANT CHANGE UP (ref 0–0.2)
BASOPHILS NFR BLD AUTO: 0.6 % — SIGNIFICANT CHANGE UP (ref 0–1)
BILIRUB SERPL-MCNC: 0.8 MG/DL — SIGNIFICANT CHANGE UP (ref 0.2–1.2)
BILIRUB UR-MCNC: NEGATIVE — SIGNIFICANT CHANGE UP
BUN SERPL-MCNC: 20 MG/DL — SIGNIFICANT CHANGE UP (ref 10–20)
CALCIUM SERPL-MCNC: 8.8 MG/DL — SIGNIFICANT CHANGE UP (ref 8.4–10.5)
CHLORIDE SERPL-SCNC: 101 MMOL/L — SIGNIFICANT CHANGE UP (ref 98–110)
CO2 SERPL-SCNC: 23 MMOL/L — SIGNIFICANT CHANGE UP (ref 17–32)
COLOR SPEC: YELLOW — SIGNIFICANT CHANGE UP
CREAT SERPL-MCNC: 1.5 MG/DL — SIGNIFICANT CHANGE UP (ref 0.7–1.5)
DIFF PNL FLD: ABNORMAL
EGFR: 57 ML/MIN/1.73M2 — LOW
EOSINOPHIL # BLD AUTO: 0.13 K/UL — SIGNIFICANT CHANGE UP (ref 0–0.7)
EOSINOPHIL NFR BLD AUTO: 1.3 % — SIGNIFICANT CHANGE UP (ref 0–8)
ESTIMATED AVERAGE GLUCOSE: 157 MG/DL — HIGH (ref 68–114)
GLUCOSE SERPL-MCNC: 136 MG/DL — HIGH (ref 70–99)
GLUCOSE UR QL: >=1000 MG/DL
HCT VFR BLD CALC: 43.4 % — SIGNIFICANT CHANGE UP (ref 42–52)
HGB BLD-MCNC: 14.6 G/DL — SIGNIFICANT CHANGE UP (ref 14–18)
IMM GRANULOCYTES NFR BLD AUTO: 0.4 % — HIGH (ref 0.1–0.3)
INR BLD: 1.4 RATIO — HIGH (ref 0.65–1.3)
KETONES UR-MCNC: NEGATIVE MG/DL — SIGNIFICANT CHANGE UP
LEUKOCYTE ESTERASE UR-ACNC: NEGATIVE — SIGNIFICANT CHANGE UP
LYMPHOCYTES # BLD AUTO: 1.5 K/UL — SIGNIFICANT CHANGE UP (ref 1.2–3.4)
LYMPHOCYTES # BLD AUTO: 15.5 % — LOW (ref 20.5–51.1)
MCHC RBC-ENTMCNC: 28.1 PG — SIGNIFICANT CHANGE UP (ref 27–31)
MCHC RBC-ENTMCNC: 33.6 G/DL — SIGNIFICANT CHANGE UP (ref 32–37)
MCV RBC AUTO: 83.6 FL — SIGNIFICANT CHANGE UP (ref 80–94)
MONOCYTES # BLD AUTO: 0.63 K/UL — HIGH (ref 0.1–0.6)
MONOCYTES NFR BLD AUTO: 6.5 % — SIGNIFICANT CHANGE UP (ref 1.7–9.3)
NEUTROPHILS # BLD AUTO: 7.34 K/UL — HIGH (ref 1.4–6.5)
NEUTROPHILS NFR BLD AUTO: 75.7 % — HIGH (ref 42.2–75.2)
NITRITE UR-MCNC: NEGATIVE — SIGNIFICANT CHANGE UP
NRBC # BLD: 0 /100 WBCS — SIGNIFICANT CHANGE UP (ref 0–0)
PH UR: 6 — SIGNIFICANT CHANGE UP (ref 5–8)
PLATELET # BLD AUTO: 185 K/UL — SIGNIFICANT CHANGE UP (ref 130–400)
PMV BLD: 11.3 FL — HIGH (ref 7.4–10.4)
POTASSIUM SERPL-MCNC: 4.2 MMOL/L — SIGNIFICANT CHANGE UP (ref 3.5–5)
POTASSIUM SERPL-SCNC: 4.2 MMOL/L — SIGNIFICANT CHANGE UP (ref 3.5–5)
PROT SERPL-MCNC: 7.5 G/DL — SIGNIFICANT CHANGE UP (ref 6–8)
PROT UR-MCNC: 30 MG/DL
PROTHROM AB SERPL-ACNC: 16 SEC — HIGH (ref 9.95–12.87)
RBC # BLD: 5.19 M/UL — SIGNIFICANT CHANGE UP (ref 4.7–6.1)
RBC # FLD: 12.5 % — SIGNIFICANT CHANGE UP (ref 11.5–14.5)
SODIUM SERPL-SCNC: 141 MMOL/L — SIGNIFICANT CHANGE UP (ref 135–146)
SP GR SPEC: >1.03 — HIGH (ref 1–1.03)
UROBILINOGEN FLD QL: 0.2 MG/DL — SIGNIFICANT CHANGE UP (ref 0.2–1)
WBC # BLD: 9.7 K/UL — SIGNIFICANT CHANGE UP (ref 4.8–10.8)
WBC # FLD AUTO: 9.7 K/UL — SIGNIFICANT CHANGE UP (ref 4.8–10.8)

## 2024-05-15 PROCEDURE — 85730 THROMBOPLASTIN TIME PARTIAL: CPT

## 2024-05-15 PROCEDURE — 87086 URINE CULTURE/COLONY COUNT: CPT

## 2024-05-15 PROCEDURE — 85025 COMPLETE CBC W/AUTO DIFF WBC: CPT

## 2024-05-15 PROCEDURE — 36415 COLL VENOUS BLD VENIPUNCTURE: CPT

## 2024-05-15 PROCEDURE — 85610 PROTHROMBIN TIME: CPT

## 2024-05-15 PROCEDURE — 83036 HEMOGLOBIN GLYCOSYLATED A1C: CPT

## 2024-05-15 PROCEDURE — 99214 OFFICE O/P EST MOD 30 MIN: CPT | Mod: 25

## 2024-05-15 PROCEDURE — 93005 ELECTROCARDIOGRAM TRACING: CPT

## 2024-05-15 PROCEDURE — 81001 URINALYSIS AUTO W/SCOPE: CPT

## 2024-05-15 PROCEDURE — 80053 COMPREHEN METABOLIC PANEL: CPT

## 2024-05-15 PROCEDURE — 93010 ELECTROCARDIOGRAM REPORT: CPT

## 2024-05-15 RX ORDER — GABAPENTIN 400 MG/1
2 CAPSULE ORAL
Refills: 0 | DISCHARGE

## 2024-05-15 RX ORDER — LEVETIRACETAM 250 MG/1
1 TABLET, FILM COATED ORAL
Refills: 0 | DISCHARGE

## 2024-05-15 RX ORDER — EMPAGLIFLOZIN 10 MG/1
1 TABLET, FILM COATED ORAL
Refills: 0 | DISCHARGE

## 2024-05-15 RX ORDER — PANTOPRAZOLE SODIUM 20 MG/1
1 TABLET, DELAYED RELEASE ORAL
Refills: 0 | DISCHARGE

## 2024-05-15 RX ORDER — INSULIN GLARGINE 100 [IU]/ML
32 INJECTION, SOLUTION SUBCUTANEOUS
Refills: 0 | DISCHARGE

## 2024-05-15 RX ORDER — INSULIN LISPRO 100/ML
0 VIAL (ML) SUBCUTANEOUS
Refills: 0 | DISCHARGE

## 2024-05-15 RX ORDER — METOPROLOL TARTRATE 50 MG
1 TABLET ORAL
Refills: 0 | DISCHARGE

## 2024-05-15 RX ORDER — ATORVASTATIN CALCIUM 80 MG/1
1 TABLET, FILM COATED ORAL
Refills: 0 | DISCHARGE

## 2024-05-15 RX ORDER — SACUBITRIL AND VALSARTAN 24; 26 MG/1; MG/1
1 TABLET, FILM COATED ORAL
Refills: 0 | DISCHARGE

## 2024-05-15 RX ORDER — APIXABAN 2.5 MG/1
1 TABLET, FILM COATED ORAL
Refills: 0 | DISCHARGE

## 2024-05-15 NOTE — H&P PST ADULT - NSICDXPASTSURGICALHX_GEN_ALL_CORE_FT
Chief Complaint/Reason for Visit:   Chief Complaint   Patient presents with   • Hospital F/U     [facility] F/uP Elevated & CVA  ,,  [facility] Pt P.C[facility]P     HISTORY OF PRESENT ILLNESS: Luz Maria Perez is a 86 year old female with a past medical history of coronary artery disease, hypertension, carotid artery disease, dyslipidemia, pacemaker, CKD, anemia, GI bleeds who presented to [facility] with severe anemia.    Aspirin was discontinued.  During hospitalization she developed acute left hemispheric stroke that was treated conservatively.  Troponin was elevated suggestive of NSTEMI but medical management was recommended.    She was sent to subacute rehab for 1 week and is now home with physical therapy, Occupational Therapy, and speech therapy.    She presents to the office today for hospital follow-up accompanied by her daughter Ute.    Patient did not speak during OV today. She did respond to yes/no questions by nodding/shaking head.     REVIEW OF SYSTEMS:  Review of Systems   Unable to perform ROS: acuity of condition   Constitutional: Negative for decreased appetite.   Cardiovascular: Positive for leg swelling. Negative for chest pain, dyspnea on exertion and palpitations.   Respiratory: Negative for shortness of breath.    Neurological: Negative for dizziness.     Limited review of system obtained by patient responding to yes/no questions.  Additional review of systems by her daughter.    PAST MEDICAL HISTORY:   Past Medical History:   Diagnosis Date   • Anemia    • Anxiety    • Arthritis    • Cardiac pacemaker    • Cerebrovascular accident (CVA) due to embolism of left middle cerebral artery (CMS/HCC) 4/13/2022   • Chronic kidney disease    • Chronic pain    • Congestive cardiac failure (CMS/HCC)    • Coronary artery disease    • Depression    • Essential (primary) hypertension    • Fracture     Left wrist    • High cholesterol    • Other cirrhosis of liver (CMS/HCC) 10/29/2021   •  Otitis media    • Sinusitis, chronic    • Thyroid condition    • Urinary incontinence      PAST SURGICAL HISTORY:   Past Surgical History:   Procedure Laterality Date   • Abdomen surgery     • Back surgery     • Cardiac catherization     • Cardiac surgery     • Coronary artery bypass graft      2002   • Endarterectomy      carotid- 2009   • Eye surgery      Cataracts bilateral   • Pacemaker      2014   • Removal gallbladder     • Tonsillectomy     • Vascular surgery       FAMILY HISTORY:   No family history on file.    SOCIAL HISTORY:   Social History     Tobacco Use   • Smoking status: Former Smoker   • Smokeless tobacco: Never Used   Vaping Use   • Vaping Use: never used   Substance Use Topics   • Alcohol use: Not Currently     Comment: Occasionally    • Drug use: Never       Drug Use:    Never           ALLERGIES:   ALLERGIES:   Allergen Reactions   • Bempedoic Acid PRURITUS   • Ezetimibe Muscle Spasm     Muscle cramps   • Iodine Other (See Comments)     AFFECTS KIDNEY FUNCTION     MEDICATIONS:   Current Outpatient Medications   Medication Sig Dispense Refill   • ARIPiprazole (ABILIFY) 10 MG tablet Take 10 mg by mouth nightly.     • Cholecalciferol 50 mcg (2,000 units) tablet Take 50 mcg by mouth daily.     • cetirizine (ZyrTEC) 5 MG tablet Take 1 tablet by mouth daily. 30 tablet 0   • AMIODarone (PACERONE) 200 MG tablet Take 100 mg by mouth daily.     • ferrous sulfate 325 (65 FE) MG EC tablet Take 325 mg by mouth daily (with breakfast).     • KRILL OIL PO Take 1 tablet by mouth daily. Do not start before April 21, 2022. 500mg     • torsemide (DEMADEX) 20 MG tablet Take 20 mg by mouth 5 days a week.     • levothyroxine 50 MCG tablet Take 50 mcg by mouth daily.      • labetalol (NORMODYNE) 200 MG tablet Take 200 mg by mouth 2 times daily. TAKE ONE TABLET BY MOUTH EVERY 12 HOURS FOR BLOOD PRESSURE      • Multiple Vitamins-Minerals (Lutein-Zeaxanthin) Tab Take 10 mg by mouth daily.     • pantoprazole (PROTONIX) 40  MG tablet Take 1 tablet by mouth 2 times daily (before meals). 30 tablet 0   • sucralfate (CARAFATE) 1 g tablet Take 1 tablet by mouth 3 times daily. 120 tablet 0     No current facility-administered medications for this visit.     PHYSICAL  EXAMINATION  Visit Vitals  BP (!) 144/68 (BP Location: RUE - Right upper extremity, Patient Position: Sitting)   Pulse 80   Ht 5' 5\" (1.651 m)   Wt 79.4 kg (175 lb)   LMP  (LMP Unknown)   BMI 29.12 kg/m²       Physical Exam  Vitals reviewed.   Constitutional:       General: She is not in acute distress.     Appearance: Normal appearance. She is well-developed. She is not diaphoretic.   HENT:      Head: Normocephalic and atraumatic.   Eyes:      Conjunctiva/sclera: Conjunctivae normal.   Neck:      Vascular: No carotid bruit or JVD.   Cardiovascular:      Rate and Rhythm: Normal rate and regular rhythm.      Pulses: Normal pulses.      Heart sounds: S1 normal and S2 normal. No murmur heard.  Pulmonary:      Effort: Pulmonary effort is normal. No respiratory distress.      Breath sounds: Normal breath sounds. No wheezing or rales.   Chest:      Chest wall: No tenderness.   Abdominal:      General: There is no distension.   Musculoskeletal:         General: Normal range of motion.      Cervical back: Normal range of motion.      Comments: In wheelchair   Skin:     General: Skin is warm and dry.   Neurological:      Mental Status: She is alert. Mental status is at baseline.         Diagnostic Testing:     TTE 4/7/2022  SUMMARY:     1. Left ventricle: The cavity size is normal. Wall thickness is mildly to moderately increased. Systolic function is normal. The ejection fraction was measured by single plane method of disks. The ejection fraction is 62%.  2. Aortic valve: The annulus is normal-sized and mildly calcified. The valve is trileaflet. The leaflets are mildly thickened. Transvalvular velocity is within the normal range. There is no stenosis.  3. Mitral valve: The annulus is  normal-sized. The annulus is moderately calcified. The leaflets are moderately thickened and moderately calcified. Mild regurgitation. The peak diastolic gradient is 7mm Hg.  4. Left atrium: The atrium is severely dilated.  5. Right ventricle: Pacemaker lead noted in right ventricle.  6. Right atrium: Pacemaker lead noted in right atrium.     Labs:    Recent Labs   Lab 11/26/21  1023   Cholesterol 126   HDL 52   Triglycerides 78   CALCLDL 58   Non-HDL Cholesterol 74     Recent Labs   Lab 04/13/22  0541 04/06/22  0535 04/05/22  0541   Sodium 141   < > 144   Chloride 110*   < > 113*   BUN 47*   < > 52*   BUN/ Creatinine Ratio 34*   < > 30*   Potassium 4.1   < > 3.9   Glucose 97   < > 93   Creatinine 1.39*   < > 1.74*   Calcium 9.2   < > 8.9   TSH  --   --  6.847*    < > = values in this interval not displayed.     IMPRESSION/PLAN:    1. Hospital discharge follow-up  -Admitted to [facility] with severe anemia, GIB  -Elevated troponin-- NSTEMI  -Acute CVA    2. Coronary artery disease involving native heart, unspecified vessel or lesion type, unspecified whether angina present  3. Hx of CABG  -Elevated troponin suggestive of NSTEMI  -Echo from 4/7/2022  shows EF 62%, mild MR  -Lexiscan stress test shows a medium-sized, severe, reversible defect involving the basal and mid inferolateral wall(s), consistent with ischemia  -Continue beta blocker  -No angina  -No ASA due to GIB and anemia    4. PAF (paroxysmal atrial fibrillation) (CMS/HCC)  -Maintaining SR on amiodarone  -Not on anticoagulation due to anemia and GIB  -Continue amiodarone and beta blocker    5. Presence of cardiac pacemaker  -s/p DDD pacer implantation  -Pace dependent  -No arrhythmia on recent device check  -Continue to monitor in device clinic    6. Hypertension, unspecified type  -Blood pressure is reasonably well controlled    7. Dyslipidemia  -Lipid panel reviewed    8. Carotid artery disease, unspecified laterality, unspecified type (CMS/HCC)  -CEA  2009  -Off ASA due to anemia and GIB    9. Recent CVA  -Management conservatively  -Receiving home PT, OT, speech therapy  -Refer to neurology    4/27/2022    Return for Dr. Churchill as scheduled.    Jenn Sultana CNP   [facility]   PAST SURGICAL HISTORY:  Pacemaker     S/P BKA (below knee amputation), right     S/P coronary artery stent placement

## 2024-05-15 NOTE — H&P PST ADULT - ATTENDING COMMENTS
Risks below were explained to the patient for placement of an inflatable 3 piece penile prosthesis device    I spent additional time extensively reviewing the risks, benefits, pros and cons of the penile implant procedure. This included treatment alternatives. The patient verbalized the risks and wanted to proceed. I again made sure he knew that the penile implant is a prosthesis that contains three basic elements consisting of: two parallel hollow cylinders that are placed within the penis, a pump and valve mechanism that is placed in the scrotum, and a spherical reservoir filled with saline that is placed in the lower abdomen.    I explained that this is a surgical procedure that is relatively simple but can be more complicated in the presence of scar tissue in the pelvic area due to previous surgery and which will sometimes require an additional or alternative incision for placement of the reservoir, or potentially a narrow-based device if extensive fibrosis of the penis noted.    We reiterated that the penile implant is designed to simulate but not necessarily replace the natural erectile capability that he previously had. Once implanted, there will be limited or no capability of natural erections occurring in the future and will limit the opportunity for other treatment options such as pills or injections, to successfully work in his body. I explained how the implant reservoir has a lock-out valve to limit the opportunity for fluid to flow into the cylinders and create an unexpected or unwanted erection, called "Auto Inflation". Due to the lock-out valve, this rarely occurs, however if the reservoir is allowed to heal with the penile cylinders maintained partially inflated, he recognized that a partial erection will always be present, and it is possible for auto-inflation to occur.    We talked about the fact that the penile implant will not grow in length beyond the basic, "stretched penis". This is the length he considers to be sufficient for sexual intercourse. The glans, or tip, of the penis will not expand, as the tip of the cylinders is designed to stabilize and support the glans, but not inflate beyond the penile corona. He was also counseled that although the results with an inflatable penile prosthesis are very good, the erection will never be as large as the previous normal erection. The size of the flaccid penis may differ from that before the surgery.    He understands that the risk of serious complications during surgery is very low. There are a number of difficulties that can arise at the time of surgery and are usually overcome. Rarely the operation cannot be completed or an alternative to an inflatable penile prosthesis placed such as a semi-rigid implant. The prosthesis is a mechanical device, which may suffer mechanical failure. The current 10-year survival rate of the inflatable penile prosthesis is greater than 90%. An operation can be performed to replace a device that has suffered mechanical failure.    He has discussed the post-op care and expectations with me. He realizes that it is not uncommon for patients to experience swelling, bruising, pain, irritation, etc. for the first four-to-six weeks after the procedure.    Lastly, I made sure again that he understood that, like any surgical procedure, complications may occur. These complications include but are not limited to infection (1-4%) and erosion (1-11%) (Erosion is when the prosthesis erodes through to the outside of the body). An infected prosthesis cannot be saved by antibiotics alone and must then be removed. It is possible to insert prosthesis at the same operative session (salvage) or at a later date, but this is more difficult and the infection rate is higher. If another prosthesis cannot be inserted at the same time as the infected one removed, significant and irreversible penile shortening will occur.         I informed him that he may call for any questions or concerns at any time.

## 2024-05-15 NOTE — H&P PST ADULT - HISTORY OF PRESENT ILLNESS
Patient is a 48 year old  male presenting to [facility] in preparation for Insertion of inflatable penile prosthesis, AMS  on 5/28/24  under GA anesthesia by Dr. Renny London.    Patient is seeking penile prosthesis due to impotence.     PATIENT CURRENTLY DENIES CHEST PAIN  SHORTNESS OF BREATH  PALPITATIONS,  DYSURIA, OR UPPER RESPIRATORY INFECTION IN PAST 2 WEEKS  denies travel outside the [place] in the past 30 days      Anesthesia Alert  YES--Difficult Airway CLASS 3 AIRWAY  NO--History of neck surgery or radiation  NO--Limited ROM of neck  NO--History of Malignant hyperthermia  NO--No personal or family history of Pseudocholinesterase deficiency.  NO--Prior Anesthesia Complication  NO--Latex Allergy  NO--Loose teeth  NO--History of Rheumatoid Arthritis  yes--Bleeding risk  on eliquis  NO--MATHEW  NO--Other_____    Right lower leg stump abrasion, covered in bandage.     AS PER THE PT, THIS IS HIS/HER COMPLETE MEDICAL AND SURGICAL HX, INCLUDING MEDICATIONS PRESCRIBED AND OVER THE COUNTER    Patient verbalized understanding of instructions and was given the opportunity to ask questions and have them answered.    pt denies any suicidal ideation or thoughts, pt states not a threat to self or others    Revised Cardiac Risk Index for Pre-Operative Risk from [email or web address]  on 5/15/2024  ** All calculations should be rechecked by clinician prior to use **    RESULT SUMMARY:  4 points  Class IV Risk    15.0 %  30-day risk of death, MI, or cardiac arrest    From Duceppe 2017. These numbers are higher than those from the original study (Jerel 1999). See Evidence for details.      INPUTS:  Elevated-risk surgery —> 0 = No  History of ischemic heart disease —> 1 = Yes  History of congestive heart failure —> 1 = Yes  History of cerebrovascular disease —> 1 = Yes  Pre-operative treatment with insulin —> 1 = Yes  Pre-operative creatinine >2 mg/dL / 176.8 µmol/L —> 0 = No      Duke Activity Status Index (DASI) from [email or web address]  on 5/15/2024  ** All calculations should be rechecked by clinician prior to use **    RESULT SUMMARY:  7.25 points  The higher the score (maximum 58.2), the higher the functional status.    3.63 METs        INPUTS:  Take care of self —> 2.75 = Yes  Walk indoors —> 1.75 = Yes  Walk 1&ndash;2 blocks on level ground —> 2.75 = Yes  Climb a flight of stairs or walk up a hill —> 0 = No  Run a short distance —> 0 = No  Do light work around the house —> 0 = No  Do moderate work around the house —> 0 = No  Do heavy work around the house —> 0 = No  Do yardwork —> 0 = No  Have sexual relations —> 0 = No  Participate in moderate recreational activities —> 0 = No  Participate in strenuous sports —> 0 = No

## 2024-05-15 NOTE — H&P PST ADULT - REASON FOR ADMISSION
Case Type: OP Block TimeSuite[facility]  Confirmed Surgery DateTime: 05- - 0:00PAST DateTime: 05- - 14:15Procedure: Insertion of inflatable penile prosthesis, AMS  ERP?: NoLaterality: N/ALength of Procedure: 90 Minutes  Anesthesia Type: General

## 2024-05-15 NOTE — H&P PST ADULT - NSICDXPASTMEDICALHX_GEN_ALL_CORE_FT
PAST MEDICAL HISTORY:  Afib     CAD (coronary artery disease)     CHF (congestive heart failure)     Chronic kidney disease (CKD)     CVA (cerebral vascular accident)     DM (diabetes mellitus)     Erectile disorder     GERD (gastroesophageal reflux disease)     H/O hyperthyroidism     History of myocardial ischemia     HTN (hypertension)     Ischemic cardiomyopathy     Seizures

## 2024-05-16 DIAGNOSIS — Z01.818 ENCOUNTER FOR OTHER PREPROCEDURAL EXAMINATION: ICD-10-CM

## 2024-05-16 DIAGNOSIS — N52.01 ERECTILE DYSFUNCTION DUE TO ARTERIAL INSUFFICIENCY: ICD-10-CM

## 2024-05-16 LAB
CULTURE RESULTS: SIGNIFICANT CHANGE UP
SPECIMEN SOURCE: SIGNIFICANT CHANGE UP

## 2024-05-28 ENCOUNTER — APPOINTMENT (OUTPATIENT)
Dept: UROLOGY | Facility: HOSPITAL | Age: 49
End: 2024-05-28

## 2024-06-13 PROBLEM — I25.5 ISCHEMIC CARDIOMYOPATHY: Chronic | Status: ACTIVE | Noted: 2024-05-15

## 2024-06-13 PROBLEM — I25.10 ATHEROSCLEROTIC HEART DISEASE OF NATIVE CORONARY ARTERY WITHOUT ANGINA PECTORIS: Chronic | Status: ACTIVE | Noted: 2024-05-15

## 2024-08-06 ENCOUNTER — OUTPATIENT (OUTPATIENT)
Dept: OUTPATIENT SERVICES | Facility: HOSPITAL | Age: 49
LOS: 1 days | End: 2024-08-06
Payer: MEDICAID

## 2024-08-06 VITALS
OXYGEN SATURATION: 100 % | SYSTOLIC BLOOD PRESSURE: 128 MMHG | RESPIRATION RATE: 18 BRPM | TEMPERATURE: 98 F | DIASTOLIC BLOOD PRESSURE: 77 MMHG | WEIGHT: 222.67 LBS | HEIGHT: 75 IN | HEART RATE: 92 BPM

## 2024-08-06 DIAGNOSIS — Z95.5 PRESENCE OF CORONARY ANGIOPLASTY IMPLANT AND GRAFT: Chronic | ICD-10-CM

## 2024-08-06 DIAGNOSIS — Z95.0 PRESENCE OF CARDIAC PACEMAKER: Chronic | ICD-10-CM

## 2024-08-06 DIAGNOSIS — Z89.511 ACQUIRED ABSENCE OF RIGHT LEG BELOW KNEE: Chronic | ICD-10-CM

## 2024-08-06 DIAGNOSIS — N52.01 ERECTILE DYSFUNCTION DUE TO ARTERIAL INSUFFICIENCY: ICD-10-CM

## 2024-08-06 DIAGNOSIS — Z01.818 ENCOUNTER FOR OTHER PREPROCEDURAL EXAMINATION: ICD-10-CM

## 2024-08-06 PROBLEM — Z86.39 PERSONAL HISTORY OF OTHER ENDOCRINE, NUTRITIONAL AND METABOLIC DISEASE: Chronic | Status: INACTIVE | Noted: 2024-04-01 | Resolved: 2024-08-06

## 2024-08-06 LAB
A1C WITH ESTIMATED AVERAGE GLUCOSE RESULT: 8 % — HIGH (ref 4–5.6)
ALBUMIN SERPL ELPH-MCNC: 4.2 G/DL — SIGNIFICANT CHANGE UP (ref 3.5–5.2)
ALP SERPL-CCNC: 135 U/L — HIGH (ref 30–115)
ALT FLD-CCNC: 8 U/L — SIGNIFICANT CHANGE UP (ref 0–41)
ANION GAP SERPL CALC-SCNC: 11 MMOL/L — SIGNIFICANT CHANGE UP (ref 7–14)
APPEARANCE UR: CLEAR — SIGNIFICANT CHANGE UP
APTT BLD: 32.9 SEC — SIGNIFICANT CHANGE UP (ref 27–39.2)
AST SERPL-CCNC: 17 U/L — SIGNIFICANT CHANGE UP (ref 0–41)
BASOPHILS # BLD AUTO: 0.04 K/UL — SIGNIFICANT CHANGE UP (ref 0–0.2)
BASOPHILS NFR BLD AUTO: 0.6 % — SIGNIFICANT CHANGE UP (ref 0–1)
BILIRUB SERPL-MCNC: 0.6 MG/DL — SIGNIFICANT CHANGE UP (ref 0.2–1.2)
BILIRUB UR-MCNC: NEGATIVE — SIGNIFICANT CHANGE UP
BUN SERPL-MCNC: 24 MG/DL — HIGH (ref 10–20)
CALCIUM SERPL-MCNC: 8.6 MG/DL — SIGNIFICANT CHANGE UP (ref 8.4–10.5)
CHLORIDE SERPL-SCNC: 102 MMOL/L — SIGNIFICANT CHANGE UP (ref 98–110)
CO2 SERPL-SCNC: 26 MMOL/L — SIGNIFICANT CHANGE UP (ref 17–32)
COLOR SPEC: YELLOW — SIGNIFICANT CHANGE UP
CREAT SERPL-MCNC: 1.3 MG/DL — SIGNIFICANT CHANGE UP (ref 0.7–1.5)
DIFF PNL FLD: ABNORMAL
EGFR: 67 ML/MIN/1.73M2 — SIGNIFICANT CHANGE UP
EOSINOPHIL # BLD AUTO: 0.2 K/UL — SIGNIFICANT CHANGE UP (ref 0–0.7)
EOSINOPHIL NFR BLD AUTO: 3 % — SIGNIFICANT CHANGE UP (ref 0–8)
ESTIMATED AVERAGE GLUCOSE: 183 MG/DL — HIGH (ref 68–114)
GLUCOSE SERPL-MCNC: 267 MG/DL — HIGH (ref 70–99)
GLUCOSE UR QL: >=1000 MG/DL
HCT VFR BLD CALC: 42 % — SIGNIFICANT CHANGE UP (ref 42–52)
HGB BLD-MCNC: 14.1 G/DL — SIGNIFICANT CHANGE UP (ref 14–18)
IMM GRANULOCYTES NFR BLD AUTO: 0.3 % — SIGNIFICANT CHANGE UP (ref 0.1–0.3)
INR BLD: 1.1 RATIO — SIGNIFICANT CHANGE UP (ref 0.65–1.3)
KETONES UR-MCNC: NEGATIVE MG/DL — SIGNIFICANT CHANGE UP
LEUKOCYTE ESTERASE UR-ACNC: NEGATIVE — SIGNIFICANT CHANGE UP
LYMPHOCYTES # BLD AUTO: 2.19 K/UL — SIGNIFICANT CHANGE UP (ref 1.2–3.4)
LYMPHOCYTES # BLD AUTO: 32.7 % — SIGNIFICANT CHANGE UP (ref 20.5–51.1)
MCHC RBC-ENTMCNC: 27.7 PG — SIGNIFICANT CHANGE UP (ref 27–31)
MCHC RBC-ENTMCNC: 33.6 G/DL — SIGNIFICANT CHANGE UP (ref 32–37)
MCV RBC AUTO: 82.5 FL — SIGNIFICANT CHANGE UP (ref 80–94)
MONOCYTES # BLD AUTO: 0.55 K/UL — SIGNIFICANT CHANGE UP (ref 0.1–0.6)
MONOCYTES NFR BLD AUTO: 8.2 % — SIGNIFICANT CHANGE UP (ref 1.7–9.3)
NEUTROPHILS # BLD AUTO: 3.69 K/UL — SIGNIFICANT CHANGE UP (ref 1.4–6.5)
NEUTROPHILS NFR BLD AUTO: 55.2 % — SIGNIFICANT CHANGE UP (ref 42.2–75.2)
NITRITE UR-MCNC: NEGATIVE — SIGNIFICANT CHANGE UP
NRBC # BLD: 0 /100 WBCS — SIGNIFICANT CHANGE UP (ref 0–0)
PH UR: 6 — SIGNIFICANT CHANGE UP (ref 5–8)
PLATELET # BLD AUTO: 152 K/UL — SIGNIFICANT CHANGE UP (ref 130–400)
PMV BLD: 10.7 FL — HIGH (ref 7.4–10.4)
POTASSIUM SERPL-MCNC: 4.8 MMOL/L — SIGNIFICANT CHANGE UP (ref 3.5–5)
POTASSIUM SERPL-SCNC: 4.8 MMOL/L — SIGNIFICANT CHANGE UP (ref 3.5–5)
PROT SERPL-MCNC: 7 G/DL — SIGNIFICANT CHANGE UP (ref 6–8)
PROT UR-MCNC: SIGNIFICANT CHANGE UP MG/DL
PROTHROM AB SERPL-ACNC: 12.6 SEC — SIGNIFICANT CHANGE UP (ref 9.95–12.87)
RBC # BLD: 5.09 M/UL — SIGNIFICANT CHANGE UP (ref 4.7–6.1)
RBC # FLD: 13.3 % — SIGNIFICANT CHANGE UP (ref 11.5–14.5)
SODIUM SERPL-SCNC: 139 MMOL/L — SIGNIFICANT CHANGE UP (ref 135–146)
SP GR SPEC: >1.03 — HIGH (ref 1–1.03)
T3 SERPL-MCNC: 104 NG/DL — SIGNIFICANT CHANGE UP (ref 80–200)
T4 AB SER-ACNC: 7.3 UG/DL — SIGNIFICANT CHANGE UP (ref 4.6–12)
TSH SERPL-MCNC: 3.02 UIU/ML — SIGNIFICANT CHANGE UP (ref 0.27–4.2)
UROBILINOGEN FLD QL: 0.2 MG/DL — SIGNIFICANT CHANGE UP (ref 0.2–1)
WBC # BLD: 6.69 K/UL — SIGNIFICANT CHANGE UP (ref 4.8–10.8)
WBC # FLD AUTO: 6.69 K/UL — SIGNIFICANT CHANGE UP (ref 4.8–10.8)

## 2024-08-06 PROCEDURE — 85025 COMPLETE CBC W/AUTO DIFF WBC: CPT

## 2024-08-06 PROCEDURE — 84480 ASSAY TRIIODOTHYRONINE (T3): CPT

## 2024-08-06 PROCEDURE — 83036 HEMOGLOBIN GLYCOSYLATED A1C: CPT

## 2024-08-06 PROCEDURE — 80053 COMPREHEN METABOLIC PANEL: CPT

## 2024-08-06 PROCEDURE — 81001 URINALYSIS AUTO W/SCOPE: CPT

## 2024-08-06 PROCEDURE — 87086 URINE CULTURE/COLONY COUNT: CPT

## 2024-08-06 PROCEDURE — 99214 OFFICE O/P EST MOD 30 MIN: CPT | Mod: 25

## 2024-08-06 PROCEDURE — 84436 ASSAY OF TOTAL THYROXINE: CPT

## 2024-08-06 PROCEDURE — 36415 COLL VENOUS BLD VENIPUNCTURE: CPT

## 2024-08-06 PROCEDURE — 93010 ELECTROCARDIOGRAM REPORT: CPT

## 2024-08-06 PROCEDURE — 85730 THROMBOPLASTIN TIME PARTIAL: CPT

## 2024-08-06 PROCEDURE — 85610 PROTHROMBIN TIME: CPT

## 2024-08-06 PROCEDURE — 84443 ASSAY THYROID STIM HORMONE: CPT

## 2024-08-06 PROCEDURE — 93005 ELECTROCARDIOGRAM TRACING: CPT

## 2024-08-06 NOTE — H&P PST ADULT - HISTORY OF PRESENT ILLNESS
Patient is a 48 year old male presenting to PAST in preparation for  Insertion of inflatable penile prosthesis, AMS on 8/20  under gen anesthesia by Dr. London.  PATIENT CURRENTLY DENIES CHEST PAIN  SHORTNESS OF BREATH  PALPITATIONS,  DYSURIA, OR UPPER RESPIRATORY INFECTION IN PAST 2 WEEKS    Anesthesia Alert  NO--Difficult Airway  NO--History of neck surgery or radiation  NO--Limited ROM of neck  NO--History of Malignant hyperthermia  NO--Personal or family history of Pseudocholinesterase deficiency  NO--Prior Anesthesia Complication  NO--Latex Allergy  NO--Loose teeth  NO--History of Rheumatoid Arthritis  NO--MATHEW  NO-- BLEEDING RISK  NO--Other_____    As per patient, this is their complete medical and surgical history, including medications both prescribed or over the counter.  Patient verbalized understanding of instructions and was given the opportunity to ask questions and have them answered.   Patient is a 48 year old male presenting to PAST in preparation for  Insertion of inflatable penile prosthesis, AMS on 8/20  under gen anesthesia by Dr. London.  Reports h/o erectile dysfunction having elective procedure  PATIENT CURRENTLY DENIES CHEST PAIN  SHORTNESS OF BREATH  PALPITATIONS,  DYSURIA, OR UPPER RESPIRATORY INFECTION IN PAST 2 WEEKS    Anesthesia Alert  NO--Difficult Airway  NO--History of neck surgery or radiation  NO--Limited ROM of neck  NO--History of Malignant hyperthermia  NO--Personal or family history of Pseudocholinesterase deficiency  NO--Prior Anesthesia Complication  NO--Latex Allergy  NO--Loose teeth  NO--History of Rheumatoid Arthritis  NO--MATHEW  yes-- BLEEDING RISK( takes eliquis daily) reports he was advised to stop eliquis 48 hours prior  NO--Other_____          As per patient, this is their complete medical and surgical history, including medications both prescribed or over the counter.  Duke Activity Status Index (DASI) from [employer]  on 8/6/2024      RESULT SUMMARY:  7.25 points  The higher the score (maximum 58.2), the higher the functional status.    3.63 METs        INPUTS:  Take care of self —> 2.75 = Yes  Walk indoors —> 1.75 = Yes  Walk 1&ndash;2 blocks on level ground —> 2.75 = Yes  Climb a flight of stairs or walk up a hill —> 0 = No  Run a short distance —> 0 = No  Do light work around the house —> 0 = No  Do moderate work around the house —> 0 = No  Do heavy work around the house —> 0 = No  Do yardwork —> 0 = No  Have sexual relations —> 0 = No  Participate in moderate recreational activities —> 0 = No  Participate in strenuous sports —> 0 = No  Patient verbalized understanding of instructions and was given the opportunity to ask questions and have them answered.        Revised Cardiac Risk Index for Pre-Operative Risk from [employer]  on 8/6/2024      RESULT SUMMARY:  4 points  Class IV Risk    15.0 %  30-day risk of death, MI, or cardiac arrest    From Duceppe 2017. These numbers are higher than those from the original study (Jerel 1999). See Evidence for details.      INPUTS:  Elevated-risk surgery —> 0 = No  History of ischemic heart disease —> 1 = Yes  History of congestive heart failure —> 1 = Yes  History of cerebrovascular disease —> 1 = Yes  Pre-operative treatment with insulin —> 1 = Yes  Pre-operative creatinine >2 mg/dL / 176.8 µmol/L —> 0 = No      As per patient, this is their complete medical and surgical history, including medications both prescribed or over the counter.  Patient verbalized understanding of instructions and was given the opportunity to ask questions and have them answered.

## 2024-08-06 NOTE — H&P PST ADULT - ATTENDING COMMENTS
Plan for insertion of inflatable three piece penile prosthesis  Risks below were explained to the patient.    I spent additional time extensively reviewing the risks, benefits, pros and cons of the penile implant procedure. This included treatment alternatives. The patient verbalized the risks and wanted to proceed. I again made sure he knew that the penile implant is a prosthesis that contains three basic elements consisting of: two parallel hollow cylinders that are placed within the penis, a pump and valve mechanism that is placed in the scrotum, and a spherical reservoir filled with saline that is placed in the lower abdomen.    I explained that this is a surgical procedure that is relatively simple but can be more complicated in the presence of scar tissue in the pelvic area due to previous surgery and which will sometimes require an additional or alternative incision for placement of the reservoir, or potentially a narrow-based device if extensive fibrosis of the penis noted.    We reiterated that the penile implant is designed to simulate but not necessarily replace the natural erectile capability that he previously had. Once implanted, there will be limited or no capability of natural erections occurring in the future and will limit the opportunity for other treatment options such as pills or injections, to successfully work in his body. I explained how the implant reservoir has a lock-out valve to limit the opportunity for fluid to flow into the cylinders and create an unexpected or unwanted erection, called "Auto Inflation". Due to the lock-out valve, this rarely occurs, however if the reservoir is allowed to heal with the penile cylinders maintained partially inflated, he recognized that a partial erection will always be present, and it is possible for auto-inflation to occur.    We talked about the fact that the penile implant will not grow in length beyond the basic, "stretched penis". This is the length he considers to be sufficient for sexual intercourse. The glans, or tip, of the penis will not expand, as the tip of the cylinders is designed to stabilize and support the glans, but not inflate beyond the penile corona. He was also counseled that although the results with an inflatable penile prosthesis are very good, the erection will never be as large as the previous normal erection. The size of the flaccid penis may differ from that before the surgery.    He understands that the risk of serious complications during surgery is very low. There are a number of difficulties that can arise at the time of surgery and are usually overcome. Rarely the operation cannot be completed or an alternative to an inflatable penile prosthesis placed such as a semi-rigid implant. The prosthesis is a mechanical device, which may suffer mechanical failure. The current 10-year survival rate of the inflatable penile prosthesis is greater than 90%. An operation can be performed to replace a device that has suffered mechanical failure.    He has discussed the post-op care and expectations with me. He realizes that it is not uncommon for patients to experience swelling, bruising, pain, irritation, etc. for the first four-to-six weeks after the procedure. He understands that he should not have any form of sexual intercourse that involves his prosthesis without my medical clearance.    Lastly, I made sure again that he understood that, like any surgical procedure, complications may occur. These complications include but are not limited to infection (1-4%) and erosion (1-11%) (Erosion is when the prosthesis erodes through to the outside of the body). An infected prosthesis cannot be saved by antibiotics alone and must then be removed. It is possible to insert prosthesis at the same operative session (salvage) or at a later date, but this is more difficult and the infection rate is higher. If another prosthesis cannot be inserted at the same time as the infected one removed, significant and irreversible penile shortening will occur.         I informed him that he may call my office for any questions or concerns at any time.

## 2024-08-06 NOTE — H&P PST ADULT - NSICDXPASTMEDICALHX_GEN_ALL_CORE_FT
PAST MEDICAL HISTORY:  Afib     CAD (coronary artery disease)     CHF (congestive heart failure)     Chronic kidney disease (CKD)     CVA (cerebral vascular accident)     DM (diabetes mellitus)     Erectile disorder     GERD (gastroesophageal reflux disease)     H/O hyperthyroidism     History of myocardial ischemia     HTN (hypertension)     Ischemic cardiomyopathy     Seizures      PAST MEDICAL HISTORY:  Afib     CAD (coronary artery disease)     CHF (congestive heart failure)     Chronic kidney disease (CKD)     CVA (cerebral vascular accident)     DM (diabetes mellitus)     Erectile disorder     GERD (gastroesophageal reflux disease)     History of myocardial ischemia     HTN (hypertension)     Ischemic cardiomyopathy     Seizures

## 2024-08-07 DIAGNOSIS — N52.01 ERECTILE DYSFUNCTION DUE TO ARTERIAL INSUFFICIENCY: ICD-10-CM

## 2024-08-07 DIAGNOSIS — Z01.818 ENCOUNTER FOR OTHER PREPROCEDURAL EXAMINATION: ICD-10-CM

## 2024-08-07 LAB
CULTURE RESULTS: SIGNIFICANT CHANGE UP
SPECIMEN SOURCE: SIGNIFICANT CHANGE UP

## 2024-08-20 ENCOUNTER — TRANSCRIPTION ENCOUNTER (OUTPATIENT)
Age: 49
End: 2024-08-20

## 2024-08-20 ENCOUNTER — APPOINTMENT (OUTPATIENT)
Dept: UROLOGY | Facility: HOSPITAL | Age: 49
End: 2024-08-20

## 2024-08-20 DIAGNOSIS — G89.18 OTHER ACUTE POSTPROCEDURAL PAIN: ICD-10-CM

## 2024-08-20 RX ORDER — LEVETIRACETAM 1000 MG/1
1 TABLET, FILM COATED ORAL
Refills: 0 | DISCHARGE

## 2024-08-20 RX ORDER — FAMOTIDINE 40 MG/1
1 TABLET, FILM COATED ORAL
Refills: 0 | DISCHARGE

## 2024-08-20 RX ORDER — TRAMADOL HYDROCHLORIDE 25 MG/1
25 TABLET, COATED ORAL
Qty: 14 | Refills: 0 | Status: ACTIVE | COMMUNITY
Start: 2024-08-20 | End: 1900-01-01

## 2024-08-20 RX ORDER — SULFAMETHOXAZOLE AND TRIMETHOPRIM 800; 160 MG/1; MG/1
800-160 TABLET ORAL TWICE DAILY
Qty: 14 | Refills: 0 | Status: ACTIVE | COMMUNITY
Start: 2024-08-20 | End: 1900-01-01

## 2024-08-20 RX ORDER — MELOXICAM 15 MG/1
15 TABLET ORAL DAILY
Qty: 14 | Refills: 0 | Status: ACTIVE | COMMUNITY
Start: 2024-08-20 | End: 1900-01-01

## 2024-08-20 RX ORDER — METOPROLOL TARTRATE 100 MG
1 TABLET ORAL
Refills: 0 | DISCHARGE

## 2024-08-21 ENCOUNTER — NON-APPOINTMENT (OUTPATIENT)
Age: 49
End: 2024-08-21

## 2024-08-22 ENCOUNTER — APPOINTMENT (OUTPATIENT)
Dept: UROLOGY | Facility: CLINIC | Age: 49
End: 2024-08-22
Payer: MEDICAID

## 2024-08-22 VITALS
BODY MASS INDEX: 27.35 KG/M2 | HEIGHT: 75 IN | DIASTOLIC BLOOD PRESSURE: 83 MMHG | OXYGEN SATURATION: 96 % | HEART RATE: 80 BPM | TEMPERATURE: 99 F | WEIGHT: 220 LBS | SYSTOLIC BLOOD PRESSURE: 142 MMHG

## 2024-08-22 DIAGNOSIS — Z98.890 OTHER SPECIFIED POSTPROCEDURAL STATES: ICD-10-CM

## 2024-08-22 DIAGNOSIS — N52.01 ERECTILE DYSFUNCTION DUE TO ARTERIAL INSUFFICIENCY: ICD-10-CM

## 2024-08-22 PROCEDURE — G2211 COMPLEX E/M VISIT ADD ON: CPT | Mod: NC

## 2024-08-22 PROCEDURE — 99214 OFFICE O/P EST MOD 30 MIN: CPT | Mod: 24

## 2024-08-22 NOTE — HISTORY OF PRESENT ILLNESS
[FreeTextEntry1] : 49M w hx of uncontrolled type 2 diabetes, hypertension, A-fib status post pacemaker currently on Eliquis, CVA, MI, and below the knee amputation, presents for evaluation of erectile dysfunction and discussion regarding penile prosthesis. He has a long history of erectile dysfunction, approximately 3 years, and has tried and failed sildenafil 100 mg and Cialis 20 mg. He reports that he was scheduled to undergo penile prosthesis placement in the [place] in June 2023. At that time, he developed a nonhealing right foot ulcer which eventually led to below-knee amputation. He followed up with another urologist who performed a Doppler study, which demonstrated no response to Caverject injection, erectile dysfunction likely due to arterial insufficiency. Presents today to discuss penile prosthesis.  Office visit 4/19/24 Pt presents for a post op visit. He is s/p circumcision on 4/9/24. Reports that his pain has been well controlled. No fevers or chills at home. Took all of his appropriate medications and has been applying bacitracin to his incision daily. On exam, his incision is intact with sutures in place. No evidence of infection ie warmth, erythema, fluctuance or purulent drainage. He is still interested in having a penile prosthesis placed we reviewed all the risk benefits and alternatives to the procedure and patient provided verbal consent.  Office 5/10/24 Pt presents for wound check now that he is 4 weeks post op. He has made a near 100% recovery. Reports having no penile pain. On exam there is no swelling, inflammation or drainage. Sutures have all fallen off. There is a small superficial 2mm skin defect at the ~10 o'clock position.  Office visit 8/22/24 Pt is now s/p IPP placement on 8/20/24. Presents today for INGRID drain removal and post op check. Reports he has had scant output in the past 24hrs. Recently started his AC yesterday. Has been taking his antibiotics for prophylaxis as instructed. Denies having any fevers or chills. Post-op pain has been well controlled with his medications. No significant swelling or ecchymosis on exam. No drainage from his incision site, suture line intact. No fevers or chills. Pt reported voiding on the distal tip of his penile wrap dressing yesterday, today his dressing was malodorous with some mild erythema noted at the ventral glans. This is likely from compression of his coban dressing and accumulation of urine. Able to cycle device today without issues. Pt reported no significant pain and tolerated it well.  INGRID drain removed today without issues, minimal output recorded, <10cc in the last 6 hours.

## 2024-08-22 NOTE — ASSESSMENT
[FreeTextEntry1] : #ED s/p IPP placement on 8/20/24 - presents today for INGRID drain removal and readjustment/inflation/deflation of his implant device to ensure the cylinders and pump settle in appropriately as the wound begins to form fibrotic tissue - c/w antibiotics until completion - Pt already safely resumed AC - Pt understands he should not have any sexual intercourse until he is cleared by me - Follow up in 1-2 week for further cycling of his device/teaching with TYREE La - He will continue to apply bacitracin to the small skin abrasion on his ventral glans/corona and over his incision line - Can follow up with me afterwards in 1-4 weeks pending his progress and physical exam  #Phimosis - resolved - completely healed from circumcision on 4/9/24

## 2024-08-22 NOTE — HISTORY OF PRESENT ILLNESS
[FreeTextEntry1] : 49M w hx of uncontrolled type 2 diabetes, hypertension, A-fib status post pacemaker currently on Eliquis, CVA, MI, and below the knee amputation, presents for evaluation of erectile dysfunction and discussion regarding penile prosthesis. He has a long history of erectile dysfunction, approximately 3 years, and has tried and failed sildenafil 100 mg and Cialis 20 mg. He reports that he was scheduled to undergo penile prosthesis placement in the [place] in June 2023. At that time, he developed a nonhealing right foot ulcer which eventually led to below-knee amputation. He followed up with another urologist who performed a Doppler study, which demonstrated no response to Caverject injection, erectile dysfunction likely due to arterial insufficiency. Presents today to discuss penile prosthesis.  Office visit 4/19/24 Pt presents for a post op visit. He is s/p circumcision on 4/9/24. Reports that his pain has been well controlled. No fevers or chills at home. Took all of his appropriate medications and has been applying bacitracin to his incision daily. On exam, his incision is intact with sutures in place. No evidence of infection ie warmth, erythema, fluctuance or purulent drainage. He is still interested in having a penile prosthesis placed we reviewed all the risk benefits and alternatives to the procedure and patient provided verbal consent.  Office 5/10/24 Pt presents for wound check now that he is 4 weeks post op. He has made a near 100% recovery. Reports having no penile pain. On exam there is no swelling, inflammation or drainage. Sutures have all fallen off. There is a small superficial 2mm skin defect at the ~10 o'clock position.  Office visit 8/22/24 Pt is now s/p IPP placement on 8/20/24. Presents today for INGRID drain removal and post op check. Reports he has had scant output in the past 24hrs. Recently started his AC yesterday. Has been taking his antibiotics for prophylaxis as instructed. Denies having any fevers or chills. Post-op pain has been well controlled with his medications. No significant swelling or ecchymosis on exam. No drainage from his incision site, suture line intact. No fevers or chills. Pt reported voiding on the distal tip of his penile wrap dressing yesterday, today his dressing was malodorous with some mild erythema noted at the ventral glans. This is likely from compression of his coban dressing and accumulation of urine. Able to cycle device today without issues. Pt reported no significant pain and tolerated it well.  INGRID drain removed today without issues, minimal output recorded, <10cc in the last 6 hours.

## 2024-08-22 NOTE — PLAN

## 2024-08-22 NOTE — PLAN

## 2024-08-28 ENCOUNTER — APPOINTMENT (OUTPATIENT)
Dept: UROLOGY | Facility: CLINIC | Age: 49
End: 2024-08-28
Payer: MEDICAID

## 2024-08-28 VITALS
WEIGHT: 226 LBS | SYSTOLIC BLOOD PRESSURE: 129 MMHG | HEIGHT: 75 IN | OXYGEN SATURATION: 96 % | TEMPERATURE: 98 F | BODY MASS INDEX: 28.1 KG/M2 | DIASTOLIC BLOOD PRESSURE: 84 MMHG | HEART RATE: 80 BPM

## 2024-08-28 DIAGNOSIS — Z45.89 ENCOUNTER FOR ADJUSTMENT AND MANAGEMENT OF OTHER IMPLANTED DEVICES: ICD-10-CM

## 2024-08-28 PROCEDURE — 99024 POSTOP FOLLOW-UP VISIT: CPT

## 2024-08-28 NOTE — PHYSICAL EXAM
[General Appearance - In No Acute Distress] : no acute distress [] : no respiratory distress [Normal Station and Gait] : the gait and station were normal for the patient's age [Oriented To Time, Place, And Person] : oriented to person, place, and time [de-identified] : Penile implant in place.  Device easily locatable within the scrotum.  There is no swelling.  There is no ecchymosis, erythema, nor purulent discharge from incision site.  There is no edema.  No pain to palpation.  No erosions.  No skin exfoliation. [de-identified] : Ambulates with leg prosthesis.

## 2024-08-28 NOTE — ASSESSMENT
[FreeTextEntry1] : 49-year-old status post penile implant August 20, 2024. Benign physical exam today.  No signs or evidence of infection.  I was able to inflate and deflate device without difficulty.  No pain to inflation or deflation.  Patient was educated on proper technique to inflate and deflate.  Patient was able to demonstrate technique prior to end of visit.  Patient inflated and deflated device without assistance.  He will inflate and deflate device twice daily for 15 minutes.  He verbalized understanding that he is NOT cleared to use and plan for sex as he is too close to his operative date. He understands risks of infection. He verbalized that he will wait for Dr. London's clearance prior to using device for sex.  Follow-up 3 weeks with Dr. London. Instructed patient to contact myself and Dr. London with any issues.

## 2024-08-28 NOTE — HISTORY OF PRESENT ILLNESS
[FreeTextEntry1] : Rafiq is a 49-year-old status post penile implant surgery with Dr. London August 20, 2024.  Presents to office today for IPP training.  Denies any severe pains.  Denies any swelling.

## 2024-09-19 ENCOUNTER — NON-APPOINTMENT (OUTPATIENT)
Age: 49
End: 2024-09-19

## 2024-09-20 ENCOUNTER — APPOINTMENT (OUTPATIENT)
Dept: UROLOGY | Facility: CLINIC | Age: 49
End: 2024-09-20
Payer: MEDICAID

## 2024-09-20 VITALS
SYSTOLIC BLOOD PRESSURE: 144 MMHG | WEIGHT: 226 LBS | BODY MASS INDEX: 28.1 KG/M2 | HEIGHT: 75 IN | HEART RATE: 90 BPM | TEMPERATURE: 98 F | RESPIRATION RATE: 17 BRPM | DIASTOLIC BLOOD PRESSURE: 88 MMHG | OXYGEN SATURATION: 96 %

## 2024-09-20 DIAGNOSIS — Z45.89 ENCOUNTER FOR ADJUSTMENT AND MANAGEMENT OF OTHER IMPLANTED DEVICES: ICD-10-CM

## 2024-09-20 DIAGNOSIS — Z96.0 PRESENCE OF UROGENITAL IMPLANTS: ICD-10-CM

## 2024-09-20 PROCEDURE — 99024 POSTOP FOLLOW-UP VISIT: CPT

## 2024-09-20 PROCEDURE — 99213 OFFICE O/P EST LOW 20 MIN: CPT | Mod: 24,25

## 2024-09-20 NOTE — HISTORY OF PRESENT ILLNESS
[FreeTextEntry1] : 49M w hx of uncontrolled type 2 diabetes, hypertension, A-fib status post pacemaker currently on Eliquis, CVA, MI, and below the knee amputation, presents for evaluation of erectile dysfunction and discussion regarding penile prosthesis. He has a long history of erectile dysfunction, approximately 3 years, and has tried and failed sildenafil 100 mg and Cialis 20 mg. He reports that he was scheduled to undergo penile prosthesis placement in the [place] in June 2023. At that time, he developed a nonhealing right foot ulcer which eventually led to below-knee amputation. He followed up with another urologist who performed a Doppler study, which demonstrated no response to Caverject injection, erectile dysfunction likely due to arterial insufficiency. Presents today to discuss penile prosthesis.  Office visit 4/19/24 Pt presents for a post op visit. He is s/p circumcision on 4/9/24. Reports that his pain has been well controlled. No fevers or chills at home. Took all of his appropriate medications and has been applying bacitracin to his incision daily. On exam, his incision is intact with sutures in place. No evidence of infection ie warmth, erythema, fluctuance or purulent drainage. He is still interested in having a penile prosthesis placed we reviewed all the risk benefits and alternatives to the procedure and patient provided verbal consent.  Office 5/10/24 Pt presents for wound check now that he is 4 weeks post op. He has made a near 100% recovery. Reports having no penile pain. On exam there is no swelling, inflammation or drainage. Sutures have all fallen off. There is a small superficial 2mm skin defect at the ~10 o'clock position.  Office visit 8/22/24 Pt is now s/p IPP placement on 8/20/24. Presents today for INGRID drain removal and post op check. Reports he has had scant output in the past 24hrs. Recently started his AC yesterday. Has been taking his antibiotics for prophylaxis as instructed. Denies having any fevers or chills. Post-op pain has been well controlled with his medications. No significant swelling or ecchymosis on exam. No drainage from his incision site, suture line intact. No fevers or chills. Pt reported voiding on the distal tip of his penile wrap dressing yesterday, today his dressing was malodorous with some mild erythema noted at the ventral glans. This is likely from compression of his coban dressing and accumulation of urine. Able to cycle device today without issues. Pt reported no significant pain and tolerated it well. INGRID drain removed today without issues, minimal output recorded, <10cc in the last 6 hours.  Office Visit 09/20/2024  Pt has made a complete recovery from his IPP surgery. He is very satisfied with the functional and cosmetic outcome. However, he has only cycled it once since his last visit as he's been upset given the recent passing of his son. Today I was able to completely inflate and deflate his device. Pump in adequate position in the midline inferior scrotum. Emotional support and resources provided.

## 2024-09-20 NOTE — HISTORY OF PRESENT ILLNESS
[FreeTextEntry1] : 49M w hx of uncontrolled type 2 diabetes, hypertension, A-fib status post pacemaker currently on Eliquis, CVA, MI, and below the knee amputation, presents for evaluation of erectile dysfunction and discussion regarding penile prosthesis. He has a long history of erectile dysfunction, approximately 3 years, and has tried and failed sildenafil 100 mg and Cialis 20 mg. He reports that he was scheduled to undergo penile prosthesis placement in the [place] in June 2023. At that time, he developed a nonhealing right foot ulcer which eventually led to below-knee amputation. He followed up with another urologist who performed a Doppler study, which demonstrated no response to Caverject injection, erectile dysfunction likely due to arterial insufficiency. Presents today to discuss penile prosthesis.  Office visit 4/19/24 Pt presents for a post op visit. He is s/p circumcision on 4/9/24. Reports that his pain has been well controlled. No fevers or chills at home. Took all of his appropriate medications and has been applying bacitracin to his incision daily. On exam, his incision is intact with sutures in place. No evidence of infection ie warmth, erythema, fluctuance or purulent drainage. He is still interested in having a penile prosthesis placed we reviewed all the risk benefits and alternatives to the procedure and patient provided verbal consent.  Office 5/10/24 Pt presents for wound check now that he is 4 weeks post op. He has made a near 100% recovery. Reports having no penile pain. On exam there is no swelling, inflammation or drainage. Sutures have all fallen off. There is a small superficial 2mm skin defect at the ~10 o'clock position.  Office visit 8/22/24 Pt is now s/p IPP placement on 8/20/24. Presents today for INGRID drain removal and post op check. Reports he has had scant output in the past 24hrs. Recently started his AC yesterday. Has been taking his antibiotics for prophylaxis as instructed. Denies having any fevers or chills. Post-op pain has been well controlled with his medications. No significant swelling or ecchymosis on exam. No drainage from his incision site, suture line intact. No fevers or chills. Pt reported voiding on the distal tip of his penile wrap dressing yesterday, today his dressing was malodorous with some mild erythema noted at the ventral glans. This is likely from compression of his coban dressing and accumulation of urine. Able to cycle device today without issues. Pt reported no significant pain and tolerated it well. INGRID drain removed today without issues, minimal output recorded, <10cc in the last 6 hours.  Office Visit 09/20/2024  Pt has made a complete recovery from his IPP surgery. He is very satisfied with the functional and cosmetic outcome. However, he has only cycled it once since his last visit as he's been upset given the recent passing of his son. Today I was able to completely inflate and deflate his device. Pump in adequate position in the midline inferior scrotum. Emotional support and resources provided.

## 2024-09-20 NOTE — PHYSICAL EXAM
[de-identified] : penile cyclinders in adequte position palpated at tip of glans, symmetrical, pump in mid inferior scrotum, no herniation of reservoir, implant able to be cycled easily

## 2024-09-20 NOTE — ASSESSMENT
[FreeTextEntry1] : #ED s/p IPP placement on 8/20/24/Management and adjustment of implanted device - presents today for readjustment/inflation/deflation of his implant device to ensure the cylinders and pump settle in appropriately as the wound begins to form fibrotic tissue. - I was able to cycle the device without issues and educated patient.  - Pt is cleared from a urological perspective for sexual relations  - He can follow up in 6 weeks with TYREE La should he have any difficulty with cycling.  All questions and concerns addressed. Emotional support provided.   #Phimosis - resolved - completely healed from circumcision on 4/9/24.

## 2024-09-20 NOTE — PLAN

## 2024-09-20 NOTE — PHYSICAL EXAM
[de-identified] : penile cyclinders in adequte position palpated at tip of glans, symmetrical, pump in mid inferior scrotum, no herniation of reservoir, implant able to be cycled easily

## 2024-09-20 NOTE — PLAN

## 2024-11-11 ENCOUNTER — APPOINTMENT (OUTPATIENT)
Dept: UROLOGY | Facility: CLINIC | Age: 49
End: 2024-11-11
Payer: MEDICAID

## 2024-11-11 DIAGNOSIS — N52.01 ERECTILE DYSFUNCTION DUE TO ARTERIAL INSUFFICIENCY: ICD-10-CM

## 2024-11-11 PROCEDURE — 99024 POSTOP FOLLOW-UP VISIT: CPT

## 2025-04-14 ENCOUNTER — APPOINTMENT (OUTPATIENT)
Dept: UROLOGY | Facility: CLINIC | Age: 50
End: 2025-04-14
Payer: MEDICAID

## 2025-04-14 DIAGNOSIS — Z96.0 PRESENCE OF UROGENITAL IMPLANTS: ICD-10-CM

## 2025-04-14 DIAGNOSIS — N52.01 ERECTILE DYSFUNCTION DUE TO ARTERIAL INSUFFICIENCY: ICD-10-CM

## 2025-04-14 DIAGNOSIS — Z45.89 ENCOUNTER FOR ADJUSTMENT AND MANAGEMENT OF OTHER IMPLANTED DEVICES: ICD-10-CM

## 2025-04-14 PROCEDURE — 99213 OFFICE O/P EST LOW 20 MIN: CPT

## 2025-04-14 PROCEDURE — G2211 COMPLEX E/M VISIT ADD ON: CPT | Mod: NC
